# Patient Record
Sex: FEMALE | URBAN - METROPOLITAN AREA
[De-identification: names, ages, dates, MRNs, and addresses within clinical notes are randomized per-mention and may not be internally consistent; named-entity substitution may affect disease eponyms.]

---

## 2020-10-05 ENCOUNTER — RECORDS - HEALTHEAST (OUTPATIENT)
Dept: LAB | Facility: CLINIC | Age: 70
End: 2020-10-05

## 2020-10-06 LAB
25(OH)D3 SERPL-MCNC: 46.1 NG/ML (ref 30–80)
ANION GAP SERPL CALCULATED.3IONS-SCNC: 7 MMOL/L (ref 5–18)
BASOPHILS # BLD AUTO: 0 THOU/UL (ref 0–0.2)
BASOPHILS NFR BLD AUTO: 0 % (ref 0–2)
BUN SERPL-MCNC: 11 MG/DL (ref 8–28)
CALCIUM SERPL-MCNC: 9.3 MG/DL (ref 8.5–10.5)
CHLORIDE BLD-SCNC: 108 MMOL/L (ref 98–107)
CO2 SERPL-SCNC: 30 MMOL/L (ref 22–31)
CREAT SERPL-MCNC: 0.7 MG/DL (ref 0.6–1.1)
EOSINOPHIL # BLD AUTO: 0.1 THOU/UL (ref 0–0.4)
EOSINOPHIL NFR BLD AUTO: 2 % (ref 0–6)
ERYTHROCYTE [DISTWIDTH] IN BLOOD BY AUTOMATED COUNT: 11.5 % (ref 11–14.5)
GFR SERPL CREATININE-BSD FRML MDRD: >60 ML/MIN/1.73M2
GLUCOSE BLD-MCNC: 90 MG/DL (ref 70–125)
HCT VFR BLD AUTO: 38 % (ref 35–47)
HGB BLD-MCNC: 12.6 G/DL (ref 12–16)
IMM GRANULOCYTES # BLD: 0 THOU/UL
IMM GRANULOCYTES NFR BLD: 1 %
LYMPHOCYTES # BLD AUTO: 2.2 THOU/UL (ref 0.8–4.4)
LYMPHOCYTES NFR BLD AUTO: 36 % (ref 20–40)
MCH RBC QN AUTO: 31 PG (ref 27–34)
MCHC RBC AUTO-ENTMCNC: 33.2 G/DL (ref 32–36)
MCV RBC AUTO: 94 FL (ref 80–100)
MONOCYTES # BLD AUTO: 0.8 THOU/UL (ref 0–0.9)
MONOCYTES NFR BLD AUTO: 14 % (ref 2–10)
NEUTROPHILS # BLD AUTO: 2.8 THOU/UL (ref 2–7.7)
NEUTROPHILS NFR BLD AUTO: 47 % (ref 50–70)
PLATELET # BLD AUTO: 400 THOU/UL (ref 140–440)
PMV BLD AUTO: 11.5 FL (ref 8.5–12.5)
POTASSIUM BLD-SCNC: 3.9 MMOL/L (ref 3.5–5)
RBC # BLD AUTO: 4.06 MILL/UL (ref 3.8–5.4)
SODIUM SERPL-SCNC: 145 MMOL/L (ref 136–145)
TSH SERPL DL<=0.005 MIU/L-ACNC: 1.99 UIU/ML (ref 0.3–5)
VIT B12 SERPL-MCNC: >2000 PG/ML (ref 213–816)
WBC: 5.9 THOU/UL (ref 4–11)

## 2020-10-14 ENCOUNTER — RECORDS - HEALTHEAST (OUTPATIENT)
Dept: LAB | Facility: CLINIC | Age: 70
End: 2020-10-14

## 2020-10-15 LAB
25(OH)D3 SERPL-MCNC: 43 NG/ML (ref 30–80)
BASOPHILS # BLD AUTO: 0 THOU/UL (ref 0–0.2)
BASOPHILS NFR BLD AUTO: 1 % (ref 0–2)
EOSINOPHIL # BLD AUTO: 0.1 THOU/UL (ref 0–0.4)
EOSINOPHIL NFR BLD AUTO: 1 % (ref 0–6)
ERYTHROCYTE [DISTWIDTH] IN BLOOD BY AUTOMATED COUNT: 11.6 % (ref 11–14.5)
HCT VFR BLD AUTO: 41.1 % (ref 35–47)
HGB BLD-MCNC: 13.4 G/DL (ref 12–16)
IMM GRANULOCYTES # BLD: 0 THOU/UL
IMM GRANULOCYTES NFR BLD: 0 %
LEVETIRACETAM (KEPPRA): 32.8 UG/ML (ref 6–46)
LYMPHOCYTES # BLD AUTO: 2.1 THOU/UL (ref 0.8–4.4)
LYMPHOCYTES NFR BLD AUTO: 43 % (ref 20–40)
MCH RBC QN AUTO: 30.7 PG (ref 27–34)
MCHC RBC AUTO-ENTMCNC: 32.6 G/DL (ref 32–36)
MCV RBC AUTO: 94 FL (ref 80–100)
MONOCYTES # BLD AUTO: 0.9 THOU/UL (ref 0–0.9)
MONOCYTES NFR BLD AUTO: 18 % (ref 2–10)
NEUTROPHILS # BLD AUTO: 1.9 THOU/UL (ref 2–7.7)
NEUTROPHILS NFR BLD AUTO: 38 % (ref 50–70)
PLATELET # BLD AUTO: 255 THOU/UL (ref 140–440)
PMV BLD AUTO: 12.4 FL (ref 8.5–12.5)
RBC # BLD AUTO: 4.37 MILL/UL (ref 3.8–5.4)
VALPROATE SERPL-MCNC: 58.6 UG/ML (ref 50–150)
VIT B12 SERPL-MCNC: 1734 PG/ML (ref 213–816)
WBC: 4.9 THOU/UL (ref 4–11)

## 2025-02-24 ENCOUNTER — LAB REQUISITION (OUTPATIENT)
Dept: LAB | Facility: CLINIC | Age: 75
End: 2025-02-24
Payer: MEDICARE

## 2025-02-24 DIAGNOSIS — Z79.899 OTHER LONG TERM (CURRENT) DRUG THERAPY: ICD-10-CM

## 2025-02-24 DIAGNOSIS — F25.0 SCHIZOAFFECTIVE DISORDER, BIPOLAR TYPE (H): ICD-10-CM

## 2025-02-24 LAB
BASOPHILS # BLD AUTO: 0 10E3/UL (ref 0–0.2)
BASOPHILS NFR BLD AUTO: 0 %
EOSINOPHIL # BLD AUTO: 0 10E3/UL (ref 0–0.7)
EOSINOPHIL NFR BLD AUTO: 1 %
ERYTHROCYTE [DISTWIDTH] IN BLOOD BY AUTOMATED COUNT: 11.9 % (ref 10–15)
HCT VFR BLD AUTO: 39.5 % (ref 35–47)
HGB BLD-MCNC: 13.6 G/DL (ref 11.7–15.7)
IMM GRANULOCYTES # BLD: 0 10E3/UL
IMM GRANULOCYTES NFR BLD: 0 %
LYMPHOCYTES # BLD AUTO: 2 10E3/UL (ref 0.8–5.3)
LYMPHOCYTES NFR BLD AUTO: 36 %
MCH RBC QN AUTO: 31.9 PG (ref 26.5–33)
MCHC RBC AUTO-ENTMCNC: 34.4 G/DL (ref 31.5–36.5)
MCV RBC AUTO: 93 FL (ref 78–100)
MONOCYTES # BLD AUTO: 0.7 10E3/UL (ref 0–1.3)
MONOCYTES NFR BLD AUTO: 12 %
NEUTROPHILS # BLD AUTO: 2.9 10E3/UL (ref 1.6–8.3)
NEUTROPHILS NFR BLD AUTO: 52 %
NRBC # BLD AUTO: 0 10E3/UL
NRBC BLD AUTO-RTO: 0 /100
PLATELET # BLD AUTO: 164 10E3/UL (ref 150–450)
RBC # BLD AUTO: 4.27 10E6/UL (ref 3.8–5.2)
WBC # BLD AUTO: 5.7 10E3/UL (ref 4–11)

## 2025-02-24 PROCEDURE — 85025 COMPLETE CBC W/AUTO DIFF WBC: CPT | Mod: ORL | Performed by: PSYCHIATRY & NEUROLOGY

## 2025-03-03 ENCOUNTER — LAB REQUISITION (OUTPATIENT)
Dept: LAB | Facility: CLINIC | Age: 75
End: 2025-03-03
Payer: COMMERCIAL

## 2025-03-03 DIAGNOSIS — Z79.899 OTHER LONG TERM (CURRENT) DRUG THERAPY: ICD-10-CM

## 2025-03-03 DIAGNOSIS — F25.0 SCHIZOAFFECTIVE DISORDER, BIPOLAR TYPE (H): ICD-10-CM

## 2025-03-03 LAB
BASOPHILS # BLD AUTO: 0 10E3/UL (ref 0–0.2)
BASOPHILS NFR BLD AUTO: 1 %
EOSINOPHIL # BLD AUTO: 0.1 10E3/UL (ref 0–0.7)
EOSINOPHIL NFR BLD AUTO: 1 %
ERYTHROCYTE [DISTWIDTH] IN BLOOD BY AUTOMATED COUNT: 11.7 % (ref 10–15)
HCT VFR BLD AUTO: 39.8 % (ref 35–47)
HGB BLD-MCNC: 13.7 G/DL (ref 11.7–15.7)
IMM GRANULOCYTES # BLD: 0 10E3/UL
IMM GRANULOCYTES NFR BLD: 0 %
LYMPHOCYTES # BLD AUTO: 2.4 10E3/UL (ref 0.8–5.3)
LYMPHOCYTES NFR BLD AUTO: 37 %
MCH RBC QN AUTO: 31.8 PG (ref 26.5–33)
MCHC RBC AUTO-ENTMCNC: 34.4 G/DL (ref 31.5–36.5)
MCV RBC AUTO: 92 FL (ref 78–100)
MONOCYTES # BLD AUTO: 0.7 10E3/UL (ref 0–1.3)
MONOCYTES NFR BLD AUTO: 10 %
NEUTROPHILS # BLD AUTO: 3.2 10E3/UL (ref 1.6–8.3)
NEUTROPHILS NFR BLD AUTO: 51 %
NRBC # BLD AUTO: 0 10E3/UL
NRBC BLD AUTO-RTO: 0 /100
PLATELET # BLD AUTO: 178 10E3/UL (ref 150–450)
RBC # BLD AUTO: 4.31 10E6/UL (ref 3.8–5.2)
WBC # BLD AUTO: 6.3 10E3/UL (ref 4–11)

## 2025-03-03 PROCEDURE — 85025 COMPLETE CBC W/AUTO DIFF WBC: CPT | Mod: ORL | Performed by: PSYCHIATRY & NEUROLOGY

## 2025-03-10 ENCOUNTER — LAB REQUISITION (OUTPATIENT)
Dept: LAB | Facility: CLINIC | Age: 75
End: 2025-03-10
Payer: COMMERCIAL

## 2025-03-10 DIAGNOSIS — F25.0 SCHIZOAFFECTIVE DISORDER, BIPOLAR TYPE (H): ICD-10-CM

## 2025-03-10 DIAGNOSIS — Z79.899 OTHER LONG TERM (CURRENT) DRUG THERAPY: ICD-10-CM

## 2025-03-10 LAB
BASOPHILS # BLD AUTO: 0 10E3/UL (ref 0–0.2)
BASOPHILS NFR BLD AUTO: 0 %
EOSINOPHIL # BLD AUTO: 0.1 10E3/UL (ref 0–0.7)
EOSINOPHIL NFR BLD AUTO: 1 %
ERYTHROCYTE [DISTWIDTH] IN BLOOD BY AUTOMATED COUNT: 11.7 % (ref 10–15)
HCT VFR BLD AUTO: 41.7 % (ref 35–47)
HGB BLD-MCNC: 14.2 G/DL (ref 11.7–15.7)
IMM GRANULOCYTES # BLD: 0 10E3/UL
IMM GRANULOCYTES NFR BLD: 0 %
LYMPHOCYTES # BLD AUTO: 3.1 10E3/UL (ref 0.8–5.3)
LYMPHOCYTES NFR BLD AUTO: 45 %
MCH RBC QN AUTO: 31.9 PG (ref 26.5–33)
MCHC RBC AUTO-ENTMCNC: 34.1 G/DL (ref 31.5–36.5)
MCV RBC AUTO: 94 FL (ref 78–100)
MONOCYTES # BLD AUTO: 0.8 10E3/UL (ref 0–1.3)
MONOCYTES NFR BLD AUTO: 11 %
NEUTROPHILS # BLD AUTO: 3 10E3/UL (ref 1.6–8.3)
NEUTROPHILS NFR BLD AUTO: 43 %
NRBC # BLD AUTO: 0 10E3/UL
NRBC BLD AUTO-RTO: 0 /100
PLATELET # BLD AUTO: 208 10E3/UL (ref 150–450)
RBC # BLD AUTO: 4.45 10E6/UL (ref 3.8–5.2)
WBC # BLD AUTO: 7.1 10E3/UL (ref 4–11)

## 2025-03-10 PROCEDURE — 85025 COMPLETE CBC W/AUTO DIFF WBC: CPT | Mod: ORL | Performed by: PSYCHIATRY & NEUROLOGY

## 2025-03-17 ENCOUNTER — LAB REQUISITION (OUTPATIENT)
Dept: LAB | Facility: CLINIC | Age: 75
End: 2025-03-17
Payer: COMMERCIAL

## 2025-03-17 DIAGNOSIS — F25.0 SCHIZOAFFECTIVE DISORDER, BIPOLAR TYPE (H): ICD-10-CM

## 2025-03-17 DIAGNOSIS — Z79.899 OTHER LONG TERM (CURRENT) DRUG THERAPY: ICD-10-CM

## 2025-03-17 LAB
BASOPHILS # BLD AUTO: 0 10E3/UL (ref 0–0.2)
BASOPHILS NFR BLD AUTO: 0 %
EOSINOPHIL # BLD AUTO: 0.1 10E3/UL (ref 0–0.7)
EOSINOPHIL NFR BLD AUTO: 1 %
ERYTHROCYTE [DISTWIDTH] IN BLOOD BY AUTOMATED COUNT: 11.9 % (ref 10–15)
HCT VFR BLD AUTO: 43.5 % (ref 35–47)
HGB BLD-MCNC: 14.7 G/DL (ref 11.7–15.7)
IMM GRANULOCYTES # BLD: 0 10E3/UL
IMM GRANULOCYTES NFR BLD: 0 %
LYMPHOCYTES # BLD AUTO: 2.8 10E3/UL (ref 0.8–5.3)
LYMPHOCYTES NFR BLD AUTO: 38 %
MCH RBC QN AUTO: 31.5 PG (ref 26.5–33)
MCHC RBC AUTO-ENTMCNC: 33.8 G/DL (ref 31.5–36.5)
MCV RBC AUTO: 93 FL (ref 78–100)
MONOCYTES # BLD AUTO: 0.6 10E3/UL (ref 0–1.3)
MONOCYTES NFR BLD AUTO: 8 %
NEUTROPHILS # BLD AUTO: 4 10E3/UL (ref 1.6–8.3)
NEUTROPHILS NFR BLD AUTO: 53 %
NRBC # BLD AUTO: 0 10E3/UL
NRBC BLD AUTO-RTO: 0 /100
PLATELET # BLD AUTO: 231 10E3/UL (ref 150–450)
RBC # BLD AUTO: 4.66 10E6/UL (ref 3.8–5.2)
WBC # BLD AUTO: 7.5 10E3/UL (ref 4–11)

## 2025-03-17 PROCEDURE — 85025 COMPLETE CBC W/AUTO DIFF WBC: CPT | Mod: ORL | Performed by: PSYCHIATRY & NEUROLOGY

## 2025-03-24 ENCOUNTER — LAB REQUISITION (OUTPATIENT)
Dept: LAB | Facility: CLINIC | Age: 75
End: 2025-03-24
Payer: COMMERCIAL

## 2025-03-24 DIAGNOSIS — F25.0 SCHIZOAFFECTIVE DISORDER, BIPOLAR TYPE (H): ICD-10-CM

## 2025-03-24 DIAGNOSIS — Z79.899 OTHER LONG TERM (CURRENT) DRUG THERAPY: ICD-10-CM

## 2025-03-24 LAB
BASOPHILS # BLD AUTO: 0 10E3/UL (ref 0–0.2)
BASOPHILS NFR BLD AUTO: 0 %
EOSINOPHIL # BLD AUTO: 0.1 10E3/UL (ref 0–0.7)
EOSINOPHIL NFR BLD AUTO: 1 %
ERYTHROCYTE [DISTWIDTH] IN BLOOD BY AUTOMATED COUNT: 11.8 % (ref 10–15)
HCT VFR BLD AUTO: 43 % (ref 35–47)
HGB BLD-MCNC: 14.5 G/DL (ref 11.7–15.7)
IMM GRANULOCYTES # BLD: 0 10E3/UL
IMM GRANULOCYTES NFR BLD: 0 %
LYMPHOCYTES # BLD AUTO: 2.8 10E3/UL (ref 0.8–5.3)
LYMPHOCYTES NFR BLD AUTO: 40 %
MCH RBC QN AUTO: 31.3 PG (ref 26.5–33)
MCHC RBC AUTO-ENTMCNC: 33.7 G/DL (ref 31.5–36.5)
MCV RBC AUTO: 93 FL (ref 78–100)
MONOCYTES # BLD AUTO: 0.6 10E3/UL (ref 0–1.3)
MONOCYTES NFR BLD AUTO: 9 %
NEUTROPHILS # BLD AUTO: 3.4 10E3/UL (ref 1.6–8.3)
NEUTROPHILS NFR BLD AUTO: 49 %
NRBC # BLD AUTO: 0 10E3/UL
NRBC BLD AUTO-RTO: 0 /100
PLATELET # BLD AUTO: 248 10E3/UL (ref 150–450)
RBC # BLD AUTO: 4.63 10E6/UL (ref 3.8–5.2)
VALPROATE SERPL-MCNC: 51.3 UG/ML
WBC # BLD AUTO: 6.9 10E3/UL (ref 4–11)

## 2025-03-24 PROCEDURE — 85025 COMPLETE CBC W/AUTO DIFF WBC: CPT | Mod: ORL | Performed by: PSYCHIATRY & NEUROLOGY

## 2025-03-24 PROCEDURE — 80164 ASSAY DIPROPYLACETIC ACD TOT: CPT | Mod: ORL | Performed by: PSYCHIATRY & NEUROLOGY

## 2025-03-31 ENCOUNTER — LAB REQUISITION (OUTPATIENT)
Dept: LAB | Facility: CLINIC | Age: 75
End: 2025-03-31
Payer: COMMERCIAL

## 2025-03-31 ENCOUNTER — MEDICAL CORRESPONDENCE (OUTPATIENT)
Dept: HEALTH INFORMATION MANAGEMENT | Facility: CLINIC | Age: 75
End: 2025-03-31

## 2025-03-31 DIAGNOSIS — F25.0 SCHIZOAFFECTIVE DISORDER, BIPOLAR TYPE (H): ICD-10-CM

## 2025-03-31 DIAGNOSIS — Z79.899 OTHER LONG TERM (CURRENT) DRUG THERAPY: ICD-10-CM

## 2025-03-31 LAB
BASOPHILS # BLD AUTO: 0 10E3/UL (ref 0–0.2)
BASOPHILS NFR BLD AUTO: 0 %
EOSINOPHIL # BLD AUTO: 0.1 10E3/UL (ref 0–0.7)
EOSINOPHIL NFR BLD AUTO: 1 %
ERYTHROCYTE [DISTWIDTH] IN BLOOD BY AUTOMATED COUNT: 12 % (ref 10–15)
HCT VFR BLD AUTO: 42.7 % (ref 35–47)
HGB BLD-MCNC: 14.4 G/DL (ref 11.7–15.7)
IMM GRANULOCYTES # BLD: 0 10E3/UL
IMM GRANULOCYTES NFR BLD: 0 %
LYMPHOCYTES # BLD AUTO: 2.4 10E3/UL (ref 0.8–5.3)
LYMPHOCYTES NFR BLD AUTO: 34 %
MCH RBC QN AUTO: 30.8 PG (ref 26.5–33)
MCHC RBC AUTO-ENTMCNC: 33.7 G/DL (ref 31.5–36.5)
MCV RBC AUTO: 91 FL (ref 78–100)
MONOCYTES # BLD AUTO: 0.7 10E3/UL (ref 0–1.3)
MONOCYTES NFR BLD AUTO: 10 %
NEUTROPHILS # BLD AUTO: 3.7 10E3/UL (ref 1.6–8.3)
NEUTROPHILS NFR BLD AUTO: 54 %
NRBC # BLD AUTO: 0 10E3/UL
NRBC BLD AUTO-RTO: 0 /100
PLATELET # BLD AUTO: 216 10E3/UL (ref 150–450)
RBC # BLD AUTO: 4.68 10E6/UL (ref 3.8–5.2)
VALPROATE SERPL-MCNC: 34.7 UG/ML
WBC # BLD AUTO: 6.9 10E3/UL (ref 4–11)

## 2025-03-31 PROCEDURE — 80164 ASSAY DIPROPYLACETIC ACD TOT: CPT | Mod: ORL | Performed by: PSYCHIATRY & NEUROLOGY

## 2025-03-31 PROCEDURE — 85025 COMPLETE CBC W/AUTO DIFF WBC: CPT | Mod: ORL | Performed by: PSYCHIATRY & NEUROLOGY

## 2025-04-01 DIAGNOSIS — G23.1 PROGRESSIVE SUPRANUCLEAR PALSY (H): Primary | ICD-10-CM

## 2025-04-07 ENCOUNTER — LAB REQUISITION (OUTPATIENT)
Dept: LAB | Facility: CLINIC | Age: 75
End: 2025-04-07
Payer: COMMERCIAL

## 2025-04-07 DIAGNOSIS — Z79.899 OTHER LONG TERM (CURRENT) DRUG THERAPY: ICD-10-CM

## 2025-04-07 DIAGNOSIS — F25.0 SCHIZOAFFECTIVE DISORDER, BIPOLAR TYPE (H): ICD-10-CM

## 2025-04-07 LAB
BASOPHILS # BLD AUTO: 0 10E3/UL (ref 0–0.2)
BASOPHILS NFR BLD AUTO: 0 %
EOSINOPHIL # BLD AUTO: 0.1 10E3/UL (ref 0–0.7)
EOSINOPHIL NFR BLD AUTO: 1 %
ERYTHROCYTE [DISTWIDTH] IN BLOOD BY AUTOMATED COUNT: 12.1 % (ref 10–15)
HCT VFR BLD AUTO: 41.4 % (ref 35–47)
HGB BLD-MCNC: 13.5 G/DL (ref 11.7–15.7)
IMM GRANULOCYTES # BLD: 0 10E3/UL
IMM GRANULOCYTES NFR BLD: 0 %
LYMPHOCYTES # BLD AUTO: 1.9 10E3/UL (ref 0.8–5.3)
LYMPHOCYTES NFR BLD AUTO: 31 %
MCH RBC QN AUTO: 30.4 PG (ref 26.5–33)
MCHC RBC AUTO-ENTMCNC: 32.6 G/DL (ref 31.5–36.5)
MCV RBC AUTO: 93 FL (ref 78–100)
MONOCYTES # BLD AUTO: 0.7 10E3/UL (ref 0–1.3)
MONOCYTES NFR BLD AUTO: 11 %
NEUTROPHILS # BLD AUTO: 3.5 10E3/UL (ref 1.6–8.3)
NEUTROPHILS NFR BLD AUTO: 56 %
NRBC # BLD AUTO: 0 10E3/UL
NRBC BLD AUTO-RTO: 0 /100
PLATELET # BLD AUTO: 198 10E3/UL (ref 150–450)
RBC # BLD AUTO: 4.44 10E6/UL (ref 3.8–5.2)
WBC # BLD AUTO: 6.2 10E3/UL (ref 4–11)

## 2025-04-07 PROCEDURE — 85025 COMPLETE CBC W/AUTO DIFF WBC: CPT | Mod: ORL | Performed by: PSYCHIATRY & NEUROLOGY

## 2025-04-10 ENCOUNTER — TRANSCRIBE ORDERS (OUTPATIENT)
Dept: OTHER | Age: 75
End: 2025-04-10

## 2025-04-10 DIAGNOSIS — G23.1 PSP (PROGRESSIVE SUPRANUCLEAR PALSY) (H): Primary | ICD-10-CM

## 2025-04-21 ENCOUNTER — LAB REQUISITION (OUTPATIENT)
Dept: LAB | Facility: CLINIC | Age: 75
End: 2025-04-21
Payer: COMMERCIAL

## 2025-04-21 DIAGNOSIS — Z79.899 OTHER LONG TERM (CURRENT) DRUG THERAPY: ICD-10-CM

## 2025-04-21 DIAGNOSIS — F25.0 SCHIZOAFFECTIVE DISORDER, BIPOLAR TYPE (H): ICD-10-CM

## 2025-04-21 LAB
BASOPHILS # BLD AUTO: 0 10E3/UL (ref 0–0.2)
BASOPHILS NFR BLD AUTO: 0 %
EOSINOPHIL # BLD AUTO: 0.1 10E3/UL (ref 0–0.7)
EOSINOPHIL NFR BLD AUTO: 2 %
ERYTHROCYTE [DISTWIDTH] IN BLOOD BY AUTOMATED COUNT: 12.4 % (ref 10–15)
HCT VFR BLD AUTO: 39.6 % (ref 35–47)
HGB BLD-MCNC: 13.2 G/DL (ref 11.7–15.7)
IMM GRANULOCYTES # BLD: 0 10E3/UL
IMM GRANULOCYTES NFR BLD: 0 %
LYMPHOCYTES # BLD AUTO: 2.1 10E3/UL (ref 0.8–5.3)
LYMPHOCYTES NFR BLD AUTO: 33 %
MCH RBC QN AUTO: 30.9 PG (ref 26.5–33)
MCHC RBC AUTO-ENTMCNC: 33.3 G/DL (ref 31.5–36.5)
MCV RBC AUTO: 93 FL (ref 78–100)
MONOCYTES # BLD AUTO: 0.8 10E3/UL (ref 0–1.3)
MONOCYTES NFR BLD AUTO: 12 %
NEUTROPHILS # BLD AUTO: 3.4 10E3/UL (ref 1.6–8.3)
NEUTROPHILS NFR BLD AUTO: 53 %
NRBC # BLD AUTO: 0 10E3/UL
NRBC BLD AUTO-RTO: 0 /100
PLATELET # BLD AUTO: 191 10E3/UL (ref 150–450)
RBC # BLD AUTO: 4.27 10E6/UL (ref 3.8–5.2)
WBC # BLD AUTO: 6.4 10E3/UL (ref 4–11)

## 2025-04-21 PROCEDURE — 85025 COMPLETE CBC W/AUTO DIFF WBC: CPT | Mod: ORL | Performed by: PSYCHIATRY & NEUROLOGY

## 2025-04-28 ENCOUNTER — LAB REQUISITION (OUTPATIENT)
Dept: LAB | Facility: CLINIC | Age: 75
End: 2025-04-28
Payer: COMMERCIAL

## 2025-04-28 DIAGNOSIS — Z79.899 OTHER LONG TERM (CURRENT) DRUG THERAPY: ICD-10-CM

## 2025-04-28 DIAGNOSIS — F25.0 SCHIZOAFFECTIVE DISORDER, BIPOLAR TYPE (H): ICD-10-CM

## 2025-04-28 LAB
BASOPHILS # BLD AUTO: 0 10E3/UL (ref 0–0.2)
BASOPHILS NFR BLD AUTO: 0 %
EOSINOPHIL # BLD AUTO: 0.1 10E3/UL (ref 0–0.7)
EOSINOPHIL NFR BLD AUTO: 1 %
ERYTHROCYTE [DISTWIDTH] IN BLOOD BY AUTOMATED COUNT: 12.5 % (ref 10–15)
HCT VFR BLD AUTO: 39.4 % (ref 35–47)
HGB BLD-MCNC: 12.9 G/DL (ref 11.7–15.7)
IMM GRANULOCYTES # BLD: 0 10E3/UL
IMM GRANULOCYTES NFR BLD: 0 %
LYMPHOCYTES # BLD AUTO: 2.2 10E3/UL (ref 0.8–5.3)
LYMPHOCYTES NFR BLD AUTO: 38 %
MCH RBC QN AUTO: 30.9 PG (ref 26.5–33)
MCHC RBC AUTO-ENTMCNC: 32.7 G/DL (ref 31.5–36.5)
MCV RBC AUTO: 94 FL (ref 78–100)
MONOCYTES # BLD AUTO: 0.7 10E3/UL (ref 0–1.3)
MONOCYTES NFR BLD AUTO: 13 %
NEUTROPHILS # BLD AUTO: 2.8 10E3/UL (ref 1.6–8.3)
NEUTROPHILS NFR BLD AUTO: 48 %
NRBC # BLD AUTO: 0 10E3/UL
NRBC BLD AUTO-RTO: 0 /100
PLATELET # BLD AUTO: 183 10E3/UL (ref 150–450)
RBC # BLD AUTO: 4.18 10E6/UL (ref 3.8–5.2)
WBC # BLD AUTO: 5.8 10E3/UL (ref 4–11)

## 2025-04-28 PROCEDURE — 85025 COMPLETE CBC W/AUTO DIFF WBC: CPT | Mod: ORL | Performed by: PSYCHIATRY & NEUROLOGY

## 2025-05-05 ENCOUNTER — LAB REQUISITION (OUTPATIENT)
Dept: LAB | Facility: CLINIC | Age: 75
End: 2025-05-05
Payer: COMMERCIAL

## 2025-05-05 DIAGNOSIS — Z79.899 OTHER LONG TERM (CURRENT) DRUG THERAPY: ICD-10-CM

## 2025-05-05 DIAGNOSIS — F25.0 SCHIZOAFFECTIVE DISORDER, BIPOLAR TYPE (H): ICD-10-CM

## 2025-05-05 LAB
BASOPHILS # BLD AUTO: 0 10E3/UL (ref 0–0.2)
BASOPHILS NFR BLD AUTO: 1 %
EOSINOPHIL # BLD AUTO: 0.1 10E3/UL (ref 0–0.7)
EOSINOPHIL NFR BLD AUTO: 2 %
ERYTHROCYTE [DISTWIDTH] IN BLOOD BY AUTOMATED COUNT: 12.5 % (ref 10–15)
HCT VFR BLD AUTO: 42.9 % (ref 35–47)
HGB BLD-MCNC: 13.8 G/DL (ref 11.7–15.7)
IMM GRANULOCYTES # BLD: 0 10E3/UL
IMM GRANULOCYTES NFR BLD: 0 %
LYMPHOCYTES # BLD AUTO: 2.5 10E3/UL (ref 0.8–5.3)
LYMPHOCYTES NFR BLD AUTO: 44 %
MCH RBC QN AUTO: 30.7 PG (ref 26.5–33)
MCHC RBC AUTO-ENTMCNC: 32.2 G/DL (ref 31.5–36.5)
MCV RBC AUTO: 96 FL (ref 78–100)
MONOCYTES # BLD AUTO: 0.6 10E3/UL (ref 0–1.3)
MONOCYTES NFR BLD AUTO: 10 %
NEUTROPHILS # BLD AUTO: 2.5 10E3/UL (ref 1.6–8.3)
NEUTROPHILS NFR BLD AUTO: 44 %
NRBC # BLD AUTO: 0 10E3/UL
NRBC BLD AUTO-RTO: 0 /100
PLATELET # BLD AUTO: 172 10E3/UL (ref 150–450)
RBC # BLD AUTO: 4.49 10E6/UL (ref 3.8–5.2)
VALPROATE SERPL-MCNC: 65.4 UG/ML
WBC # BLD AUTO: 5.7 10E3/UL (ref 4–11)

## 2025-05-05 PROCEDURE — 85025 COMPLETE CBC W/AUTO DIFF WBC: CPT | Mod: ORL | Performed by: PSYCHIATRY & NEUROLOGY

## 2025-05-05 PROCEDURE — 80164 ASSAY DIPROPYLACETIC ACD TOT: CPT | Mod: ORL | Performed by: PSYCHIATRY & NEUROLOGY

## 2025-05-12 ENCOUNTER — LAB REQUISITION (OUTPATIENT)
Dept: LAB | Facility: CLINIC | Age: 75
End: 2025-05-12
Payer: COMMERCIAL

## 2025-05-12 DIAGNOSIS — Z79.899 OTHER LONG TERM (CURRENT) DRUG THERAPY: ICD-10-CM

## 2025-05-12 DIAGNOSIS — F25.0 SCHIZOAFFECTIVE DISORDER, BIPOLAR TYPE (H): ICD-10-CM

## 2025-05-12 PROBLEM — I44.7 LBBB (LEFT BUNDLE BRANCH BLOCK): Status: ACTIVE | Noted: 2022-05-18

## 2025-05-12 PROBLEM — F03.90 DEMENTIA WITHOUT BEHAVIORAL DISTURBANCE (H): Chronic | Status: ACTIVE | Noted: 2021-11-19

## 2025-05-12 PROBLEM — F29 PSYCHOSIS, UNSPECIFIED PSYCHOSIS TYPE (H): Status: ACTIVE | Noted: 2024-12-19

## 2025-05-12 PROBLEM — R53.1 GENERALIZED WEAKNESS: Status: ACTIVE | Noted: 2021-04-07

## 2025-05-12 PROBLEM — H40.039 ANATOMICAL NARROW ANGLE: Status: ACTIVE | Noted: 2021-10-20

## 2025-05-12 PROBLEM — F03.A0 MILD DEMENTIA (H): Status: ACTIVE | Noted: 2017-10-09

## 2025-05-12 PROBLEM — Z99.3 WHEELCHAIR DEPENDENCE: Status: ACTIVE | Noted: 2024-02-09

## 2025-05-12 PROBLEM — F20.0: Status: ACTIVE | Noted: 2024-12-19

## 2025-05-12 PROBLEM — F20.9 SCHIZOPHRENIA, CHRONIC CONDITION WITH ACUTE EXACERBATION (H): Status: ACTIVE | Noted: 2023-08-18

## 2025-05-12 PROBLEM — G23.1 PSP (PROGRESSIVE SUPRANUCLEAR PALSY) (H): Status: ACTIVE | Noted: 2025-05-12

## 2025-05-12 PROBLEM — H26.9 CATARACTS, BILATERAL: Status: ACTIVE | Noted: 2021-10-20

## 2025-05-12 PROBLEM — U07.1 COVID-19 VIRUS INFECTION: Status: ACTIVE | Noted: 2020-09-22

## 2025-05-12 PROBLEM — M85.852 OSTEOPENIA OF NECK OF LEFT FEMUR: Status: ACTIVE | Noted: 2019-12-17

## 2025-05-12 PROBLEM — K52.9 CHRONIC DIARRHEA: Chronic | Status: ACTIVE | Noted: 2021-11-19

## 2025-05-12 PROBLEM — G40.909 SEIZURE DISORDER (H): Chronic | Status: ACTIVE | Noted: 2021-11-19

## 2025-05-12 LAB
BASOPHILS # BLD AUTO: 0 10E3/UL (ref 0–0.2)
BASOPHILS NFR BLD AUTO: 0 %
EOSINOPHIL # BLD AUTO: 0.1 10E3/UL (ref 0–0.7)
EOSINOPHIL NFR BLD AUTO: 2 %
ERYTHROCYTE [DISTWIDTH] IN BLOOD BY AUTOMATED COUNT: 12.6 % (ref 10–15)
HCT VFR BLD AUTO: 45.5 % (ref 35–47)
HGB BLD-MCNC: 14.3 G/DL (ref 11.7–15.7)
IMM GRANULOCYTES # BLD: 0 10E3/UL
IMM GRANULOCYTES NFR BLD: 0 %
LYMPHOCYTES # BLD AUTO: 2.3 10E3/UL (ref 0.8–5.3)
LYMPHOCYTES NFR BLD AUTO: 38 %
MCH RBC QN AUTO: 30.6 PG (ref 26.5–33)
MCHC RBC AUTO-ENTMCNC: 31.4 G/DL (ref 31.5–36.5)
MCV RBC AUTO: 97 FL (ref 78–100)
MONOCYTES # BLD AUTO: 0.6 10E3/UL (ref 0–1.3)
MONOCYTES NFR BLD AUTO: 9 %
NEUTROPHILS # BLD AUTO: 3.1 10E3/UL (ref 1.6–8.3)
NEUTROPHILS NFR BLD AUTO: 51 %
NRBC # BLD AUTO: 0 10E3/UL
NRBC BLD AUTO-RTO: 0 /100
PLATELET # BLD AUTO: 179 10E3/UL (ref 150–450)
RBC # BLD AUTO: 4.67 10E6/UL (ref 3.8–5.2)
WBC # BLD AUTO: 6.1 10E3/UL (ref 4–11)

## 2025-05-12 PROCEDURE — 85025 COMPLETE CBC W/AUTO DIFF WBC: CPT | Mod: ORL | Performed by: PSYCHIATRY & NEUROLOGY

## 2025-05-12 RX ORDER — GABAPENTIN 100 MG/1
1 CAPSULE ORAL 3 TIMES DAILY
COMMUNITY
Start: 2024-02-19 | End: 2025-05-23

## 2025-05-12 RX ORDER — TROLAMINE SALICYLATE 10 G/100G
CREAM TOPICAL
COMMUNITY
End: 2025-05-23

## 2025-05-12 RX ORDER — GUAIFENESIN 600 MG/1
600 TABLET, EXTENDED RELEASE ORAL 2 TIMES DAILY PRN
COMMUNITY
Start: 2025-04-10 | End: 2025-05-23

## 2025-05-12 RX ORDER — LANOLIN ALCOHOL/MO/W.PET/CERES
500 CREAM (GRAM) TOPICAL DAILY
COMMUNITY
Start: 2025-02-20

## 2025-05-12 RX ORDER — LOPERAMIDE HYDROCHLORIDE 2 MG/1
TABLET ORAL
COMMUNITY
Start: 2025-03-24

## 2025-05-12 RX ORDER — SIMETHICONE 125 MG
125 TABLET,CHEWABLE ORAL 3 TIMES DAILY PRN
COMMUNITY
Start: 2025-03-24 | End: 2025-05-23

## 2025-05-12 RX ORDER — GUAIFENESIN 200 MG/10ML
LIQUID ORAL
COMMUNITY
Start: 2025-02-20

## 2025-05-12 RX ORDER — POLYETHYLENE GLYCOL 3350 17 G/17G
17 POWDER, FOR SOLUTION ORAL 2 TIMES DAILY PRN
COMMUNITY
Start: 2023-08-25

## 2025-05-12 RX ORDER — MUSCLE RUB CREAM 100; 150 MG/G; MG/G
CREAM TOPICAL
COMMUNITY

## 2025-05-12 RX ORDER — ACETAMINOPHEN 325 MG/1
2 TABLET ORAL EVERY 4 HOURS PRN
COMMUNITY
Start: 2025-02-20

## 2025-05-12 RX ORDER — PROPRANOLOL HYDROCHLORIDE 10 MG/1
10 TABLET ORAL 3 TIMES DAILY PRN
COMMUNITY
Start: 2023-11-13 | End: 2025-05-23

## 2025-05-12 RX ORDER — MENTHOL AND METHYL SALICYLATE 7.6; 29 G/100G; G/100G
OINTMENT TOPICAL
COMMUNITY
Start: 2023-09-05

## 2025-05-12 RX ORDER — AMOXICILLIN 250 MG
1 CAPSULE ORAL DAILY PRN
COMMUNITY
Start: 2025-03-24 | End: 2025-05-23

## 2025-05-12 RX ORDER — ATROPINE SULFATE 10 MG/ML
1 SOLUTION/ DROPS OPHTHALMIC 4 TIMES DAILY PRN
COMMUNITY
Start: 2024-10-01

## 2025-05-12 RX ORDER — BUSPIRONE HYDROCHLORIDE 5 MG/1
5 TABLET ORAL 2 TIMES DAILY
COMMUNITY
Start: 2024-02-19

## 2025-05-12 RX ORDER — LOPERAMIDE HYDROCHLORIDE 2 MG/1
2 CAPSULE ORAL
COMMUNITY
Start: 2025-02-20 | End: 2025-05-12

## 2025-05-12 RX ORDER — BUTALB/ACETAMINOPHEN/CAFFEINE 50-325-40
1 TABLET ORAL DAILY
COMMUNITY
Start: 2025-02-20

## 2025-05-12 RX ORDER — MELOXICAM 7.5 MG/1
7.5 TABLET ORAL 2 TIMES DAILY
COMMUNITY
Start: 2025-04-10

## 2025-05-12 RX ORDER — B COMPLEX, C NO.20/FOLIC ACID 1 MG
1 CAPSULE ORAL DAILY
COMMUNITY
Start: 2025-03-23

## 2025-05-12 RX ORDER — DONEPEZIL HYDROCHLORIDE 10 MG/1
10 TABLET, FILM COATED ORAL DAILY
COMMUNITY
Start: 2024-09-16

## 2025-05-12 RX ORDER — IBUPROFEN 200 MG
200 TABLET ORAL EVERY 6 HOURS PRN
COMMUNITY
Start: 2025-02-20

## 2025-05-12 RX ORDER — GLYCOPYRROLATE 2 MG/1
2 TABLET ORAL 2 TIMES DAILY
COMMUNITY
End: 2025-05-23

## 2025-05-12 RX ORDER — DOCUSATE SODIUM 100 MG/1
100 CAPSULE, LIQUID FILLED ORAL 2 TIMES DAILY
COMMUNITY
Start: 2025-02-20

## 2025-05-12 NOTE — PROGRESS NOTES
Diagnosis/Summary/Recommendations:    PATIENT: Nabila Jefferson  74 year old female     : 1950    NEO: May 23, 2025     MRN: [id number]    74 year old female  1950  Comm Pref:   retired at [employer]  [address]  [facility] [address]     [phone] (H)  [phone] (M)      Jad.Janna  Other Contact, Sister    Patient Contacts  Contact Name Contact Address Communication Relationship to Patient   Amanda Oconnor Unknown [phone] (Home)  [phone] (Mobile) Friend, Personal Relationship   Darlyn Barroso Unknown [phone] (Mobile) Daughter, Personal Relationship   Macy Barrientos Unknown [phone] (Mobile) Sister, Personal Relationship   Janna Jad Unknown [phone] (Mobile) Sister, Personal Relationship   Mary Garza Unknown [phone] (Mobile) Sister, Personal Relationship   Dru Jefferson Unknown [phone] (Mobile) Son, Personal Relationship   Mumtaz-Tam Unknown [phone] (Mobile) Personal Relationship     Ronny Gonsales Unknown [phone] (Work) Son, Guardian   Ifeanyi Jefferson Unknown [phone] (Mobile) Sibling, Emergency Contact   Mary Garza Unknown [phone] (Mobile) Sibling, Emergency Contact   Chio Santizo Unknown [phone] (Work) Daughter, Emergency Contact   Janna Lopezleighann Unknown [phone] (Mobile) Sibling, Emergency Contact       Care Teams  Team Member Relationship Specialty Start Date End Date   Liam Taylor MD   NPI: [id number]   [address] [place] [address] [id number] [phone] (Work)   [phone] (Fax)  PCP - General Pediatric 3/13/25     Pancho Asher MD   NPI: [id number]   [address] [place], [place] [address] [phone] (Work)   [phone] (Fax)  Psychiatry Psychiatry 13     Sadaf Nascimento RN   [address] [address]   [phone] (Work)   [phone] (Fax)  Care Manager - [facility] Registered Nurse 10/21/21     Soheila Yeboah RN   [address] [facility]    [address]   [place] [address]   [phone] (Work)   [phone] (Fax)  Care Manager - [facility] [place] 10/21/21     [facility]   [phone] (Work)      7/6/17     Jena De La TorreROSHNI   Genevive   [address] [facility], [address]   [place] [address]   [phone] (Work)  Care Manager - [facility] Mayorga MD   NPI: [id number]   [address]   [place] [address]   [phone] (Work)   [phone] (Fax)  PCP - Psychiatry Outside Provider 6/7/10         Former / Aliases:  Nabila Jefferson [phone] (Mobile)  [email or web address]       Assessment:  (G23.1) PSP (progressive supranuclear palsy) (H)  (primary encounter diagnosis)    Seizure disroder    Brain MRI 1/2/2025  1. Progression of diffuse parenchymal atrophy without specific lobar pattern since 2020.  2. Marked atrophy of the midbrain with decreased uhxsxksa-tj-ojsv ratio, which can be seen in the setting of progressive supranuclear palsy.      Reading Radiologist: Chava Lai    Brain MRI without intravenous contrast    Indication:  Parkinsonian syndrome  .    Comparison:  Head CT 9/21/2020. MRI 6/2/2010.    Technique: Multiplanar multisequence MR images of the brain without contrast a few using Parkinson's/movement disorders protocol.    Findings:  Progression of diffuse parenchymal atrophy since 2020 and 2010, evidenced by large sulci, sylvian fissures, ventricles. Significant atrophy of the midbrain with relatively preserved pontine volume, giving the appearance of the hummingbird sign on midline sagittal image (using short axis technique, the midbrain to donis ratio is estimated at 0.33).    No abnormal restricted diffusion. Few punctate T2 hyperintense foci in the cerebral hemispheric white matter. No intracranial hemorrhage or mass. Orbits are unremarkable. Small left and trace right mastoid effusions.    Chart review    Paranoid schizophrenia, subchronic condition  with acute exacerbation (CMS/[facility]) 12/19/2024     Overview (12/19/2024):    Formatting of this note might be different from the original.  EEG generalized cortical dysfunction likely medication affect; propensity for partial seizures.  Psychiatrist- Dr. Pancho Asher  - Amanda Oconnor  neuropsych testing completed 1/17/11  Admitted w/ exaccerbation 8/11 (add zoloft, d/c resperidal (dizziness resolved off med), additional clozaril)  Readmitted w/ exac 8/25/11  Current medications: depakote, clozaril,       From Chart review   Neurology: Dr. Dixon ([facility])  Last visit 9/8/2017 (no changes); weight loss,  Last seizure 2013.    Admission 4/19/13-4/27/13 then transferred to rehabilitation center; tonic clonic seizure with Jose Alfredo's paralysis; slow to wake up with confusion. Intubated for airway protection.   5/13 neurology visit:  Negative MRI, MRA, cervical MRA, EEG- focal left frontotemporal polymorphic delta slowing.  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 Dixon ([place])  Last visit 9/8/2017 (no changes); weight loss,  Last seizure 2013.    Admission 4/19/13-4/27/13 then transferred to rehabilitation center; tonic clonic seizure with Jose Alfredo's paralysis; slow to wake up with confusion. Intubated for airway protection.   5/13 neurology visit:  Negative MRI, MRA, cervical MRA, EEG- focal left frontotemporal polymorphic delta slowing.  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   This improved over time and did not have residual symptoms. This was supposedly caused by medication changes  This caused twitchiness in her hands and fingers as well     The patient began to have falls ~2 years ago and had  bradykinesia over the last year or so  These walking has worsened in the past 6 months or so  At this stage the patient is not walking much at all and is only walking to the bathroom and back  Last fall was about a month ago.  Uses a walker when she walks to the bathroom.  Has freezing of gait with walking per her PT when she is hospitalized.    The tremor was first noted while the patient was hospitalized at [facility]  She never appreciated prior to this as it is difficult to differentiate from her twitchiness of her hands from her tardive.     Denies any dystonia or significant rigidity.     Review of diagnosis    Parkinsonism     Avoidance of dopamine blockers   Is taking Clozaril    Motor complication review   None    Review of Impulse control disorders   None    Review of surgical or medication options   N/A    Gait/Balance/Falls   Last fall is a month ago  Wheelchair dependence.     Exercise/Therapy performed/offered   Has not done PT since leaving the hospital  Reports that this doesn't help her at all to do the PT    Cognitive/Driving   Memory and thinking are going well per the patient.  Donepezil has helped her memory at some point  Her memory was at the worst it had been prior to checking into the hospital in December.   Her memory has been impaired for the past 3-4 years per her son.  Has some periods of losing time    dementia    Mood   Has a diagnosis of bipolar disorder   Since leaving the hospital her mood has been a bit of a challenge   She is feeling anxious and depressed     Has intermittent spontanoeus crying but seems to be triggered by memories.     Depakote level 65.4 on 5/5/2025      Hallucinations/delusions   The patient began to have significant hallucinations in December 2024 and was hospitalized at [facility] due to not consistently getting her mediations    Had hallucinations prior to her hospitalization in December but none since then.     Paranoid schizophrneia  Psycohsis    Sleep   Is sleeping  most of the day at this point.   Part of this is driven by the fact that she doesn't want to get up and go around the group home  No history of dream enactment behavior    Bladder/Renal/Prostate/Gyn/Other  Has no urinary incontinence at this stage  Did previously when she had a bed that was too low for her to get out of bed on her own    Chronic renal disease -blood workk normal on 12/26/2024    GI/Constipation/GERD   Swallowing is going okay other than dry mouth from the atropine.   Has drooling problems and takes atropine  Constipation is not a big issue for her  Diarrhea is a chronic problem  Has some incontinence    Chronic diarrhea  Esophageal dysmotility   Liver functions normal 12/26/2024      ENDO/Lipid/DM/Bone density/Thyroid  No DM or thyroid issue    Osteopenia  T4 was normal on 12/26/2024 and tSH was increased at 4.85    Cardio/heart/Hyper or Hypotensive   Hypotension  No significant lightheadedness or dizziness  No other cardiac problems    Vision/Dry Eyes/Cataracts/Glaucoma/Macular   Feels like she can't see because she doesn't have the appropriate glasses    Anatomical narrow angle  Bilateral cataracts     Heme/Anticoagulation/Antiplatelet/Anemia/Other  No history of blood clotting disorders    CBC was normal on 5/5/2025    ENT/Resp  Covid  Has no significant breathing problems   Has an intact sense of smell    Skin/Cancer/Seborrhea/other  No issues with skin cancer    Musculoskeletal/Pain/Headache  Osteopenia left femur  Generalized weakness  Fall risk  Wheelchair dependences  Has been having severe right thigh>left thigh pain.   This has been there for quite awhile but isn't sure when this started exactly.       Other:        Medications      AM   QHS   Acetaminophen tylenol 325mg  PRN       Aspirin 81mg   1      Atropine 1% sol ORAL PRN       B complex        Buspirone buspar 5mg   1   1   Jacky citrate Vit D  1      Clozapine clozaril 200mg      1   Clozapine clozaril 50mg      1   Diclofenac  voltaren gel  PRN       Divalproex depakote ER 500mg 24h  1   1   Docusate colace 100mg   2   2   Donepezil aricept 10mg   1      Ibuprofen advil 200mg  PRN       Levetiracetam keppra 500mg  2   2   Loperamide imodium 2mg PRN       MVI  1      Melatonin 3 mg     1   Polyethylene glycol miralax   1   1   Psyllium   1      Vit B12 cyanocobalamin 500mcg  1/2                        Plan:  Parkinsonism - drug induced from Risperdal vs PD vs PSP.    We will order a few different tests to determine what exactly is going on today.    The first test is a special brain scan called a LEONA scan which can help us determine if this is drug induced or if this is due to another condition such as parkinson disease or PSP.    If that test is positive we may consider doing another test called a skin biopsy to determine if this is Parkinson disease or PSP.     In the meantime working with physical therapy would be a good place to start. We can have them come to your home to help with your rehab without making you leave home                    Documentation of a Face-to-Face Physician Encounter May 23, 2025    Nabila Jefferson  1950  [id number]    I certify that this patient is under my care and that I, or a nurse practitioner or physician's assistant working with me, had a face-to-face encounter that meets the physician face-to-face encounter requirements with this patient on: 5/23/2025.    The encounter with the patient was in whole, or in part, for the following medical condition, which is the primary reason for home health care:  Parkinsonism    I certify that, based on my findings, the following services are medically necessary home health services: Nursing, Occupational therapy and Physical Therapy    My clinical findings support the need for the above services because: Parkinsonism    Further, I certify that my clinical findings support that this patient is homebound (i.e. absences from home require considerable and taxing  effort and are for medical reasons or Yarsanism services or infrequently or of short duration when for other reasons) because: Parkinsonism      Physician signature ___________________________________   May 23, 2025  Physician name: Ajit Tolentino MD    Fax ([phone]) or scan/email ([email or web address]) this completed document to [facility] within 24 hours of the referral date.  Questions: [phone].      Coding statement:   Medical Decision Making:  #  Chronic progressive medical conditions addressed  - see above --   Review and/or interpretation of unique test or documentation from a provider outside of neurology yes   Independent historian provided additional details  no/yes  Prescription drug management and review of potential side effects and/or monitoring for side effects  -- see above ---  Health impacted by social determinants of health  yes    I have reviewed the note as documented above.  This accurately captures the substance of my conversation with the patient and total time spent preparing for visit, executing visit and completing visit on the day of the visit:  70 minutes.  The portion of this total time included face to face time     The longitudinal plan of care for Nabila Jefferson was addressed during this visit. Due to the added complexity in care, I will continue to support Nabila Jefferson in the subsequent management of this condition(s) and with the ongoing continuity of care of this condition(s).      Khadar Gray MD     ______________________________________  /83   Pulse 89   Resp 16   SpO2 94%   Gen: alert, active, attentive, appropriately groomed   Psych: mood stable     NEURO:  MS: Tangential and struggles to understand questions.     CN:  II: VFF.  III, IV, VI: Reduced EOM. Saccades appear intact without issues  VII: Face symmetric at rest and with activation  VIII: Intact to conversation  IX, X: Palate rise b/l, uvula midline.  No dysarthria.  XII: Tongue protrudes  midline. No fasciculation or atrophy noted.    Motor: Head bobbing and tongue and mouth movements   R/L  Shoulder abd      5/5  Elbow flex  5/5  Elbow ext  5/5  Wrist flex  5/5  Wrist ext  5/5    Hip flex  5/5  Knee flex  5/5  Knee ext  5/5    Reflexes: Reduced reflexes throughout. Positive gllobellar, negaitve clap, grasp and pamlmomental    Sensation:  Intact to LT in all extremities.    Coordination:  FTN intact bilaterally.    Gait:  Reduced stride length and right arm swing. Decent turns.         5/23/2025     3:00 PM   UPDRS Motor Scale   Time: 15:13   Medication Off   R Brain DBS: None   L Brain DBS: None   Dyskinesia (LID) No   Did LID interfere No   Speech 1   Facial Expression 2   Rigidity Neck 0   Rigidity RUE 1   Rigidity LUE 2   Rigidity RLE 1   Rigidity LLE 1   Finger Taps R 2   Finger Taps L 1   Hand Mvt R 1   Hand Mvt L 0   Pron-/Supinate R 1   Pron-/Supinate L 0   Toe Tap R 1   Toe Tap L 0   Leg Agility R 1   Leg Agility L 0   Postural Tremor RUE 0   Postural Tremor LUE 0   Kinetic Tremor RUE 0   Kinetic Tremor LUE 1   Rest Tremor RUE 0   Rest Tremor LUE 2   Rest Tremor RLE 0   Rest Tremor LLE 0   Rest Tremor Lip/Jaw 0   Rest Tremor Constancy 3   Total Right 8   Total Left 7     Last visit date and details:     [facility] about [facility] my questions - not sure ill get much more information from her but advised to have a family member with her on the visit. Rooming will get an updated authorization to discuss form.  Sabina Dennis RN  KG    5/20/25 10:30 AM  Note  Summary: Pre-visit plann   Upcoming visit date: 5/23/25  Provider: Dr. Gray  Referred by: Palma Loving PA-C ([facility] for a second opinion  Diagnosis: PSP     Lives in a group home, Dru (son) was recommended to attend the visit with Dr. Gray.    Goals for the visit:     1. Second opinion on her condition.     She states no family is coming with her to the appointment but someone may come with her. Recommended  "she bring a family member to have a second set of eyes and ears. Reviewed the appointment coming up with Dr. Gray. When asked about getting an updated medication list and verifying the group home she lives in she stated she is not comfortable \"answering all these questions.\" She stated will talk to us on Friday. She then asked if I can call back in a little while and hung up.    [facility] is listed as her group home. Called and left a voicemail on [facility] ([facility]) confidential line asking for a medication list to be faxed to [phone].            3/31/2025 note    Nabila Jefferson is a 74 year-old with a past medical history including dementia without behavioral disturbance, seizure disorder, schizophrenia, gait abnormality with recurrent falls, hypotension, chronic kidney disease and others who is seen in neurological follow-up related to suspected progressive supranuclear palsy. The patient was hospitalized at [place] beginning in January 2025 with changes in her mental status including increased hallucinations. PT was consulted due to gait instability and falls. Brain MRI was completed with findings suggestive of progressive supranuclear palsy. Neurology did evaluate the patient and suspected that she does have PSP. They did not recommend a trial of carbidopa levodopa because of her psychosis and history of orthostasis with recurrent falls.    She comes to clinic with a group home staff worker today. She does vaguely recall being told about PSP but is not clear on the details. Since she was discharged from hospital she has been walking short distances. She has continued to have occasional falls. She usually falls backwards. She does endorse stiffness and tremor. She is maintained on Clozaril and would not want to stop it. She recalls having a psychiatric decompensation in the past with an attempt to stop Clozaril.    Because of the potential severity of her diagnosis and the risks with using Sinemet we " "agreed to get another opinion with a movement disorder specialist at the [facility].     She has found Aricept very helpful with her cognitive function we will plan to continue this.    Our discussion included:  We reviewed that her history, examination and brain MRI are concerning for a condition called progressive supranuclear palsy. We agreed to plan a second opinion through the [facility] movement disorder clinic. I called her son, Dru, and encouraged him to attend this appointment with her. We agreed not to try carbidopa-levodopa now since it could make you schizophrenia symptoms and falls worse.   We do not have a disease modifying medication for parkinsonian disorders, including PSP.   OT and PT can help manage symptoms with PSP. Recurrent falls are common.   I will update her psychiatry provider. I refilled aricept.   We'll plan follow up here in three months but she can cancel this if she establishes care at the [facility].     Return in about 3 months (around 6/30/2025).    History of Present Illness:     Referred by Dimitri Langford MD for gait abnormality evaluation.     Ms. Jefferson was hospitalized in early 2025 and seen by neurology for evaluation of parkinsonism. Neurology met with her and the clinical picture was suggestive of parkinsomism, with a \"parkinson plus\" syndrome suspected. Progressive supranuclear palsy (PSP) seemed likely based on her presentation. Neurology was reluctant to use sinemet due to her history of orthostatic hypotension, frequent falls and psychosis. She was observed to have a resting tremor in bilateral hands, worse with distraction, shuffling gait going back 10 years, and mild rigidity. During her hospitalization, she had severe postural instability, unable to stand without assistance, sit up without assistance, or ambulate without a walker/assistance. Brain MRI was completed and concerning for PSP structural changes.     She presents with group home staff today. They " note she has been walking short distances, using a wheelchair for longer distances. She has had ongoing falls since hospital discharge. She usually falls backwards. She has noted difficulty looking upward.     She agrees she has memory impairment but feels aricept has been very helpful.     She makes her own medical decisions, has two children locally and would consider involving them more. She gives me permission to call her son.     Brain MRI 1/2025:   1. Progression of diffuse parenchymal atrophy without specific lobar pattern since 2020.   2. Marked atrophy of the midbrain with decreased vmjoeruw-ey-fjak ratio, which can be seen in the setting of progressive supranuclear palsy.     Mental Status Exam: She is awake, alert, and cooperative with interview. She can follow some simple commands easily  Cranial Nerves: saccadic intrusions of smooth movements, poor initiation of saccades, upgaze intact on my examination, facial movements symmetric, hearing intact to conversation, mild dysphonia  Motor: Symmetric strength against gravity and resistance bilaterally. Moderately increased tone bilaterally. Bradykinesia noted bilaterally. Bilateral arm resting tremor, more prominent right than left  Sensory: sensation intact to light touch on arms and legs bilaterally  Coordination: finger-nose-finger intact bilaterally  Gait: Rises from a chair with assistance of arms rests, can not take steps. Pull test negative today       ______________________________________      Patient was asked about 14 Review of systems including changes in vision (dry eyes, double vision), hearing, heart, lungs, musculoskeletal, depression, anxiety, snoring, RBD, insomnia, urinary frequency, urinary urgency, constipation, swallowing problems, hematological, ID, allergies, skin problems: seborrhea, endocrinological: thyroid, diabetes, cholesterol; balance, weight changes, and other neurological problems and these were not significant at this time  except for   Patient Active Problem List   Diagnosis    Anatomical narrow angle    Bipolar disorder (H)    Cataracts, bilateral    Chronic diarrhea    Chronic kidney disease, stage 2 (mild)    COVID-19 virus infection    Dementia without behavioral disturbance (H)    Mild dementia (H)    Esophageal dysmotility    Gait abnormality    Generalized tonic-clonic seizure (H)    Generalized weakness    Hypotension    LBBB (left bundle branch block)    Paranoid schizophrenia, subchronic condition with acute exacerbation (H)    Osteopenia of neck of left femur    Personal history of fall    Schizophrenia (H)    Schizophrenia, chronic condition with acute exacerbation (H)    Seizure disorder (H)    Wheelchair dependence    Psychosis, unspecified psychosis type (H)    PSP (progressive supranuclear palsy) (H)        No Known Allergies  No past surgical history on file.  Past Medical History:   Diagnosis Date    PSP (progressive supranuclear palsy) (H) 05/12/2025     Social History     Socioeconomic History    Marital status: Single     Spouse name: Not on file    Number of children: Not on file    Years of education: Not on file    Highest education level: Not on file   Occupational History    Not on file   Tobacco Use    Smoking status: Not on file    Smokeless tobacco: Not on file   Substance and Sexual Activity    Alcohol use: Not on file    Drug use: Not on file    Sexual activity: Not on file   Other Topics Concern    Not on file   Social History Narrative    Not on file     Social Drivers of Health     Financial Resource Strain: Patient Unable To Answer (12/19/2024)    [facility]    Overall Financial Resource Strain (CARDIA)     Difficulty of Paying Living Expenses: Patient unable to answer   Food Insecurity: Patient Unable To Answer (12/19/2024)    Received from [facility]    Hunger Vital Sign     Worried About Running Out of Food in the Last Year: Patient unable to answer     Ran Out of  Food in the Last Year: Patient unable to answer   Transportation Needs: Patient Unable To Answer (12/19/2024)    Received from [facility]    PRAPARE - Transportation     Lack of Transportation (Medical): Patient unable to answer     Lack of Transportation (Non-Medical): Patient unable to answer   Physical Activity: Unknown (12/17/2019)    Received from [facility]    Exercise Vital Sign     Days of Exercise per Week: 0 days     Minutes of Exercise per Session: Not on file   Stress: Not on file   Social Connections: Socially Integrated (4/5/2024)    Received from [facility]    Social Connections     Do you often feel lonely or isolated from those around you?: 0   Interpersonal Safety: Patient Unable To Answer (12/19/2024)    Received from [facility]    Humiliation, Afraid, Rape, and Kick questionnaire     Fear of Current or Ex-Partner: Patient unable to answer     Emotionally Abused: Patient unable to answer     Physically Abused: Patient unable to answer     Sexually Abused: Patient unable to answer   Housing Stability: Unknown (12/19/2024)    Received from [facility]    Housing Stability     What is your housing situation today?: 5 - Patient unable to answer       Drug and lactation database from the [place] [employer]:  http://toxnet.nlm.nih.gov/cgi-bin/sis/htmlgen?LACT      B/P: Data Unavailable, T: Data Unavailable, P: Data Unavailable, R: Data Unavailable 0 lbs 0 oz  There were no vitals taken for this visit., There is no height or weight on file to calculate BMI.  Medications and Vitals not listed above were documented in the cart and reviewed by me.     Current Outpatient Medications   Medication Sig Dispense Refill    acetaminophen (TYLENOL) 325 MG tablet Take 2 tablets by mouth every 4 hours as needed.      atropine 1 % ophthalmic solution Place 1 drop under the tongue 4 times daily as  needed.      busPIRone (BUSPAR) 5 MG tablet Take 5 mg by mouth 2 times daily.      calcium citrate-vitamin D (CITRACAL) 315-5 MG-MCG TABS per tablet Take 1 tablet by mouth daily.      cyanocobalamin (VITAMIN B-12) 1000 MCG tablet Take 500 mcg by mouth daily.      diclofenac (VOLTAREN) 1 % topical gel Apply 2-4 g (measured using the dosing card supplied in the drug product carton) to skin of affected area up to 4 times a day as needed for musculoskeletal pain. Wash hands after application of product.      docusate sodium (DSS) 100 MG capsule Take 100 mg by mouth 2 times daily.      donepezil (ARICEPT) 10 MG tablet Take 10 mg by mouth daily.      Ferrous Sulfate (IRON PO) Take 1 tablet by mouth daily.      gabapentin (NEURONTIN) 100 MG capsule Take 1 capsule by mouth 3 times daily.      guaiFENesin (MUCINEX) 600 MG 12 hr tablet Take 600 mg by mouth 2 times daily as needed.      ibuprofen (ADVIL/MOTRIN) 200 MG tablet Take 200 mg by mouth every 6 hours as needed.      loperamide (IMODIUM A-D) 2 MG tablet Take 2 tablets (4mg) orally with 1st loose stool, then 1 tablet (2mg) with other loose stools. Max 8 tablets (16 mg) in 24 hrs.      loperamide (IMODIUM) 2 MG capsule Take 2 mg by mouth.      meloxicam (MOBIC) 7.5 MG tablet Take 7.5 mg by mouth 2 times daily.      methyl salicylate-menthol (ICY HOT) ointment Apply topically.      multivitamin RENAL (TRIPHROCAPS) 1 capsule capsule Take 1 capsule by mouth daily.      NONFORMULARY Hospital socks for /balance.      PERMETHRIN EX Wheelchair: Standard  with leg rests: (Elevating Length of need: TBD      PERMETHRIN EX Please dispense one full size bed.      PERMETHRIN EX Hospital bed with mattress and 1/2 rails. Semi-electric bed. Length of need 3 months. Bed extender:no      PERMETHRIN EX For personal use. Length: calf Strength: 16-20 mmHg Circumference in cm: For calf: right  Ankle 24.5cm, Calf 42 cm, Ankle to calf length 42.5cm.  left Ankle 25 cm, Calf 43 cm, Ankle to  calf length 41 cm.      polyethylene glycol (MIRALAX) 17 GM/Dose powder Take 17 g by mouth 2 times daily as needed.      propranolol (INDERAL) 10 MG tablet Take 10 mg by mouth 3 times daily as needed.      psyllium 28.3 % POWD Take by mouth.      PSYLLIUM HUSK PO Take 1 packet by mouth.      senna-docusate (SENOKOT-S/PERICOLACE) 8.6-50 MG tablet Take 1 tablet by mouth daily as needed.      simethicone (MYLICON) 125 MG chewable tablet Take 125 mg by mouth 3 times daily as needed.      Skin Protectants, Misc. (HYDROCERIN) CREA Apply topically.      vitamin B-12 (CYANOCOBALAMIN) 500 MCG tablet 1 TABLET ORALLY DAILY (DX: B-12 DEFICIENCY)      aspirin 81 MG tablet Take by mouth daily      B Complex Vitamins (VITAMIN B COMPLEX) tablet Take 1 tablet by mouth daily.      Calcium Carbonate-Vitamin D (CALCIUM 600+D3 PO) Take by mouth two times daily      clozapine (CLOZARIL) 100 MG tablet 450 mg Take 450 mg by mouth once daily at bedtime.      CLOZAPINE PO Take 350 mg by mouth once daily in the morning.       divalproex (DEPAKOTE ER) 250 MG 24 hr tablet Take 250 mg by mouth daily Take 250 mgm in AM      divalproex (DEPAKOTE ER) 500 MG 24 hr tablet Take 500 mg by mouth daily Take at bedtime.       levETIRAcetam (KEPPRA) 1000 MG TABS Take one tablet by mouth twice daily      Lysine 1000 MG TABS       Multiple Vitamin (MULTIVITAMINS PO) Take  by mouth. Once daily with food.      MULTIPLE VITAMINS/IRON PO Take 1 tablet by mouth daily.      muscle rub (ARTHRITIS HOT) 10-15 % CREA Apply topically.      psyllium 100 % POWD Take 1 packet by mouth.      risperiDONE (RISPERDAL) 1 MG tablet Take by mouth daily Take at bedtime      trolamine salicylate (ASPERCREME) 10 % external cream Apply topically.           Khadar Gray MD        [place], [place]  MV    4/11/25 12:59 PM  Note  Action 4/11/25 MV   Action Taken Imaging request faxed to [facility] and Carolee Valencia      Action 4/21/25 MV 2.57p   Action Taken Images resolved                  RECORDS  RECEIVED FROM: external   REASON FOR VISIT: PSP   PROVIDER: Dr Gray   DATE OF APPT: 6/12/25   NOTES (FOR ALL VISITS) STATUS DETAILS   OFFICE NOTE from referring provider [facility] Every[facility] [place] NP @ [facility]:  3/31/25   DISCHARGE SUMMARY from hospital Care Everywhere [facility]:  11/9/23-11/16/23   DISCHARGE REPORT from the ER Care Everywhere [facility]:  12/18/24     [facility]:  12/16/24 12/11/24   MEDICATION LIST Care Everywhere     IMAGING  (FOR ALL VISITS)       MRI (HEAD, NECK, SPINE) [place] [facility]  MRI Brain 1/2/25   CT (HEAD, NECK, SPINE) PACS [facility]:  CT Cervical Spine 12/16/24  CT Head 12/16/24  CT Cervical Spine 12/12/24  CT Head 12/12/24  CT Head 2/12/24

## 2025-05-19 ENCOUNTER — LAB REQUISITION (OUTPATIENT)
Dept: LAB | Facility: CLINIC | Age: 75
End: 2025-05-19
Payer: COMMERCIAL

## 2025-05-19 DIAGNOSIS — F25.0 SCHIZOAFFECTIVE DISORDER, BIPOLAR TYPE (H): ICD-10-CM

## 2025-05-19 DIAGNOSIS — Z79.899 OTHER LONG TERM (CURRENT) DRUG THERAPY: ICD-10-CM

## 2025-05-19 LAB
BASOPHILS # BLD AUTO: 0 10E3/UL (ref 0–0.2)
BASOPHILS NFR BLD AUTO: 0 %
EOSINOPHIL # BLD AUTO: 0.1 10E3/UL (ref 0–0.7)
EOSINOPHIL NFR BLD AUTO: 1 %
ERYTHROCYTE [DISTWIDTH] IN BLOOD BY AUTOMATED COUNT: 12.3 % (ref 10–15)
HCT VFR BLD AUTO: 42.6 % (ref 35–47)
HGB BLD-MCNC: 13.9 G/DL (ref 11.7–15.7)
IMM GRANULOCYTES # BLD: 0 10E3/UL
IMM GRANULOCYTES NFR BLD: 0 %
LYMPHOCYTES # BLD AUTO: 2.6 10E3/UL (ref 0.8–5.3)
LYMPHOCYTES NFR BLD AUTO: 41 %
MCH RBC QN AUTO: 30.5 PG (ref 26.5–33)
MCHC RBC AUTO-ENTMCNC: 32.6 G/DL (ref 31.5–36.5)
MCV RBC AUTO: 94 FL (ref 78–100)
MONOCYTES # BLD AUTO: 0.7 10E3/UL (ref 0–1.3)
MONOCYTES NFR BLD AUTO: 11 %
NEUTROPHILS # BLD AUTO: 3 10E3/UL (ref 1.6–8.3)
NEUTROPHILS NFR BLD AUTO: 47 %
NRBC # BLD AUTO: 0 10E3/UL
NRBC BLD AUTO-RTO: 0 /100
PLATELET # BLD AUTO: 184 10E3/UL (ref 150–450)
RBC # BLD AUTO: 4.55 10E6/UL (ref 3.8–5.2)
VALPROATE SERPL-MCNC: 59.9 UG/ML (ref 50–100)
WBC # BLD AUTO: 6.5 10E3/UL (ref 4–11)

## 2025-05-19 PROCEDURE — 80164 ASSAY DIPROPYLACETIC ACD TOT: CPT | Mod: ORL | Performed by: PSYCHIATRY & NEUROLOGY

## 2025-05-19 PROCEDURE — 85025 COMPLETE CBC W/AUTO DIFF WBC: CPT | Mod: ORL | Performed by: PSYCHIATRY & NEUROLOGY

## 2025-05-20 ENCOUNTER — TELEPHONE (OUTPATIENT)
Dept: NEUROLOGY | Facility: CLINIC | Age: 75
End: 2025-05-20
Payer: COMMERCIAL

## 2025-05-20 NOTE — TELEPHONE ENCOUNTER
"Upcoming visit date: 5/23/25  Provider: Dr. Gray  Referred by: Pal[place] L[place]-C [place]  Diagnosis: PSP    Lives in a group home, Dru (son) was recommended to attend the visit with Dr. Gray.    Goals for the visit:     1. Second opinion on her condition.    She states no family is coming with her to the appointment but someone may come with her. Recommended she bring a family member to have a second set of eyes and ears. Reviewed the appointment coming up with Dr. Gray. When asked about getting an updated medication list and verifying the group home she lives in she stated she is not comfortable \"answering all these questions.\" She stated will talk to us on Friday. She then asked if I can call back in a little while and hung up.    [facility] is listed as her group home. Called and left a voicemail on [facility] ([facility]) confidential line asking for a medication list to be faxed to [phone].    What family members are involved in her care?  "

## 2025-05-21 NOTE — TELEPHONE ENCOUNTER
"She states she is no longer at the [facility]. She lives at [facility] in [place]. She does not have a number or further information she can give me. She states they manage her medications but is skeptical they can provide a list. \"I have it in a stack of papers, I will go through them.\" Asked that she ask for a medication list regardless and bring it with her to the appointment. She asks what the appointment is for and visit expectations were reviewed (I.e. history, physical exam, Dr. Gray is a movement disorder specialist).  "

## 2025-05-23 ENCOUNTER — OFFICE VISIT (OUTPATIENT)
Dept: NEUROLOGY | Facility: CLINIC | Age: 75
End: 2025-05-23
Attending: PHYSICIAN ASSISTANT
Payer: COMMERCIAL

## 2025-05-23 VITALS
OXYGEN SATURATION: 94 % | SYSTOLIC BLOOD PRESSURE: 135 MMHG | DIASTOLIC BLOOD PRESSURE: 83 MMHG | RESPIRATION RATE: 16 BRPM | HEART RATE: 89 BPM

## 2025-05-23 DIAGNOSIS — G20.C PRIMARY PARKINSONISM (H): ICD-10-CM

## 2025-05-23 DIAGNOSIS — G23.1 PSP (PROGRESSIVE SUPRANUCLEAR PALSY) (H): Primary | ICD-10-CM

## 2025-05-23 PROCEDURE — 99205 OFFICE O/P NEW HI 60 MIN: CPT | Performed by: PSYCHIATRY & NEUROLOGY

## 2025-05-23 PROCEDURE — 3079F DIAST BP 80-89 MM HG: CPT | Performed by: PSYCHIATRY & NEUROLOGY

## 2025-05-23 PROCEDURE — 1125F AMNT PAIN NOTED PAIN PRSNT: CPT | Performed by: PSYCHIATRY & NEUROLOGY

## 2025-05-23 PROCEDURE — 3075F SYST BP GE 130 - 139MM HG: CPT | Performed by: PSYCHIATRY & NEUROLOGY

## 2025-05-23 ASSESSMENT — UNIFIED PARKINSONS DISEASE RATING SCALE (UPDRS)
PRONATION_SUPINATION_RIGHT: (1) SLIGHT: ANY OF THE FOLLOWING: A) THE REGULAR RHYTHM IS BROKEN WITH ONE WITH ONE OR TWO INTERRUPTIONS OR HESITATIONS OF THE MOVEMENT  B) SLIGHT SLOWING  C) THE AMPLITUDE DECREMENTS NEAR THE END OF THE 10 MOVEMENTS.
RIGIDITY_NECK: (0) NORMAL: NO RIGIDITY.
SPEECH: (1) SLIGHT: LOSS OF MODULATION, DICTION OR VOLUME, BUT STILL ALL WORDS EASY TO UNDERSTAND.
TOTAL_SCORE: 8
FREEZING_GAIT: (0) NORMAL: NO FREEZING.
RIGIDITY_RUE: (1) SLIGHT: RIGIDITY ONLY DETECTED WITH ACTIVATION MANEUVER.
HANDMOVEMENTS_RIGHT: (1) SLIGHT: ANY OF THE FOLLOWING: A) THE REGULAR RHYTHM IS BROKEN WITH ONE WITH ONE OR TWO INTERRUPTIONS OR HESITATIONS OF THE MOVEMENT  B) SLIGHT SLOWING  C) THE AMPLITUDE DECREMENTS NEAR THE END OF THE 10 MOVEMENTS.
AMPLITUDE_LLE: (0) NORMAL: NO TREMOR.
RIGIDITY_LLE: (1) SLIGHT: RIGIDITY ONLY DETECTED WITH ACTIVATION MANEUVER.
FINGER_TAPPING_RIGHT: (2) MILD: ANY OF THE FOLLOWING: A) 3 TO 5 INTERRUPTIONS DURING TAPPING  B) MILD SLOWING  C) THE AMPLITUDE DECREMENTS MIDWAY IN THE 10-MOVEMENT SEQUENCE.
GAIT: (1) SLIGHT: INDEPENDENT WALKING WITH MINOR GAIT IMPAIRMENT.
HANDMOVEMENTS_LEFT: (0) NORMAL: NO PROBLEMS.
RIGIDITY_LUE: (2) MILD: RIGIDITY DETECTED WITHOUT THE ACTIVATION MANEUVER. FULL RANGE OF MOTION IS EASILY ACHIEVED.
ARISING_CHAIR: (2) MILD: PUSHES SELF UP FROM ARMS OF CHAIR WITHOUT DIFFICULTY.
LEG_AGILITY_LEFT: (0) NORMAL: NO PROBLEMS.
TOETAPPING_LEFT: (0) NORMAL: NO PROBLEMS.
CONSTANCY_TREMOR_ATREST: (3) MODERATE: TREMOR AT REST IS PRESENT 51-75% OF THE ENTIRE EXAMINATION PERIOD.
DYSKINESIAS_PRESENT: NO
FACIAL_EXPRESSION: (3) MASKED FACIES WITH LIPS PARTED SOME OF THE TIME WHEN THE MOUTH IS AT REST.
SPONTANEITY_OF_MOVEMENT: (1) SLIGHT: SLIGHT GLOBAL SLOWNESS AND POVERTY OF SPONTANEOUS MOVEMENTS.
PRONATION_SUPINATION_LEFT: (0) NORMAL: NO PROBLEMS.
FINGER_TAPPING_LEFT: (1) SLIGHT: ANY OF THE FOLLOWING: A) THE REGULAR RHYTHM IS BROKEN WITH ONE WITH ONE OR TWO INTERRUPTIONS OR HESITATIONS OF THE MOVEMENT  B) SLIGHT SLOWING  C) THE AMPLITUDE DECREMENTS NEAR THE END OF THE 10 MOVEMENTS.
TOETAPPING_RIGHT: (1) SLIGHT: ANY OF THE FOLLOWING: A) THE REGULAR RHYTHM IS BROKEN WITH ONE WITH ONE OR TWO INTERRUPTIONS OR HESITATIONS OF THE MOVEMENT  B) SLIGHT SLOWING  C) THE AMPLITUDE DECREMENTS NEAR THE END OF THE 10 MOVEMENTS.
AMPLITUDE_RLE: (0) NORMAL: NO TREMOR.
AMPLITUDE_LIP_JAW: (0) NORMAL: NO TREMOR.
RIGIDITY_RLE: (1) SLIGHT: RIGIDITY ONLY DETECTED WITH ACTIVATION MANEUVER.
AMPLITUDE_RUE: (0) NORMAL: NO TREMOR.
TOTAL_SCORE_LEFT: 7
LEG_AGILITY_RIGHT: (1) SLIGHT: ANY OF THE FOLLOWING: A) THE REGULAR RHYTHM IS BROKEN WITH ONE WITH ONE OR TWO INTERRUPTIONS OR HESITATIONS OF THE MOVEMENT  B) SLIGHT SLOWING  C) THE AMPLITUDE DECREMENTS NEAR THE END OF THE 10 MOVEMENTS.
MOVEMENTS_INTERFERE_WITH_RATINGS: NO
POSTURE: (1) SLIGHT: NOT QUITE ERECT, BUT COULD BE NORMAL FOR OLDER PERSONS.
PARKINSONS_MEDS: OFF
AMPLITUDE_LUE: (2) MILD: > 1 CM BUT < 3 CM IN MAXIMAL AMPLITUDE.

## 2025-05-23 ASSESSMENT — PAIN SCALES - GENERAL: PAINLEVEL_OUTOF10: SEVERE PAIN (7)

## 2025-05-23 NOTE — PATIENT INSTRUCTIONS
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     We will order a few different tests to determine what exactly is going on today.     The first test is a special brain scan called a LEONA scan which can help us determine if this is drug induced or if this is due to another condition such as parkinson disease or PSP.     If that test is positive we may consider doing another test called a skin biopsy to determine if this is Parkinson disease or PSP.      In the meantime working with physical therapy would be a good place to start. We can have them come to your home to help with your rehab without making you leave home

## 2025-05-23 NOTE — LETTER
2025       RE: Nabila Jefferson  [address]  [facility] [address]     Dear Colleague,    Thank you for referring your patient, Nabila Jefferson, to the [facility] NEUROLOGY [facility] [place] at [facility]. Please see a copy of my visit note below.      Diagnosis/Summary/Recommendations:    PATIENT: Nabila Jefferson  74 year old female     : 1950    NEO: May 23, 2025     MRN: [id number]    74 year old female  1950  Comm Pref:   retired at [employer]  [address]  [facility] [address]     [phone] (H)  [phone] (M)      Jad.Janna  Other Contact, Sister    Patient Contacts  Contact Name Contact Address Communication Relationship to Patient   Amanda Oconnor Unknown [phone] (Home)  [phone] (Mobile) Friend, Personal Relationship   Darlyn Chio Unknown [phone] (Mobile) Daughter, Personal Relationship   Macy Barrientos Unknown [phone] (Mobile) Sister, Personal Relationship   Janna Street Unknown [phone] (Mobile) Sister, Personal Relationship   Mary Kayla Unknown [phone] (Mobile) Sister, Personal Relationship   Dru Jefferson Unknown [phone] (Mobile) Son, Personal Relationship   Pam Unknown [phone] (Mobile) Personal Relationship     Ronny Gonsales Unknown [phone] (Work) Son, Guardian   Ifeanyi Jefferson Unknown [phone] (Mobile) Sibling, Emergency Contact   Mary Kayla Unknown [phone] (Mobile) Sibling, Emergency Contact   Newpaty Santizo Unknown [phone] (Work) Daughter, Emergency Contact   Janna German Unknown [phone] (Mobile) Sibling, Emergency Contact       Care Teams  Team Member Relationship Specialty Start Date End Date   Liam Taylor MD   NPI: [id number]   [address] [place] [id number]   [phone] (Work)   [phone] (Fax)  PCP - General Pediatric 3/13/25     Pancho Asher MD   NPI: [id number]   [address] [place] [address]   [phone] (Work)   [phone] (Fax)  Psychiatry Psychiatry 11/29/13     Sadaf Nascimento RN   [facility] [address] [phone] (Work)   [phone] (Fax)  Care Manager - [facility] Registered Nurse 10/21/21     Soheila Yeboah RN   [facility] [address] [address] [phone] (Work)   [phone] (Fax)  Care Manager - [facility] Registered Nurse 10/21/21     [facility]   [phone] (Work)      7/6/17     ROSHNI Melo   [address] [facility], [address]   [place], [place] [address] [phone] (Work)  Care Manager - [facility]         Pancho Asher MD   NPI: [id number]   [address]   [place] [address]   [phone] (Work)   [phone] (Fax)  PCP - Psychiatry Outside Provider 6/7/10         Former / Aliases:  Nabila Jefferson [phone] (Mobile)  [email or web address]       Assessment:  (G23.1) PSP (progressive supranuclear palsy) (H)  (primary encounter diagnosis)    Seizure disroder    Brain MRI 1/2/2025  1. Progression of diffuse parenchymal atrophy without specific lobar pattern since 2020.  2. Marked atrophy of the midbrain with decreased auvlamki-pd-znnp ratio, which can be seen in the setting of progressive supranuclear palsy.      Reading Radiologist: Chava Lai    Brain MRI without intravenous contrast    Indication:  Parkinsonian syndrome  .    Comparison:  Head CT 9/21/2020. MRI 6/2/2010.    Technique: Multiplanar multisequence MR images of the brain without contrast a few using Parkinson's/movement disorders protocol.    Findings:  Progression of diffuse parenchymal atrophy since 2020 and 2010, evidenced by large sulci, sylvian fissures, ventricles. Significant atrophy of the midbrain with relatively preserved pontine volume, giving the appearance of the hummingbird sign on midline sagittal image (using short axis technique, the midbrain to donis  ratio is estimated at 0.33).    No abnormal restricted diffusion. Few punctate T2 hyperintense foci in the cerebral hemispheric white matter. No intracranial hemorrhage or mass. Orbits are unremarkable. Small left and trace right mastoid effusions.    Chart review    Paranoid schizophrenia, subchronic condition with acute exacerbation (CMS/[facility]) 12/19/2024     Overview (12/19/2024):    Formatting of this note might be different from the original.  EEG generalized cortical dysfunction likely medication affect; propensity for partial seizures.  Psychiatrist- Dr. Pancho Asher  - Amanda Oconnor  neuropsych testing completed 1/17/11  Admitted w/ exaccerbation 8/11 (add zoloft, d/c resperidal (dizziness resolved off med), additional clozaril)  Readmitted w/ exac 8/25/11  Current medications: depakote, clozaril,       From Chart review   Neurology: Dr. Dixon ([facility])  Last visit 9/8/2017 (no changes); weight loss,  Last seizure 2013.    Admission 4/19/13-4/27/13 then transferred to rehabilitation center; tonic clonic seizure with Jose Alfredo's paralysis; slow to wake up with confusion. Intubated for airway protection.   5/13 neurology visit:  Negative MRI, MRA, cervical MRA, EEG- focal left frontotemporal polymorphic delta slowing.  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    Admission 4/19/13-4/27/13 then transferred to rehabilitation center; tonic clonic seizure with Jose Alfredo's paralysis; slow to wake up with confusion. Intubated for airway protection.   5/13 neurology visit:  Negative MRI, MRA, cervical MRA, EEG- focal left frontotemporal polymorphic delta slowing.  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   This improved over time and did not have residual symptoms. This was supposedly caused by medication changes  This caused twitchiness in her hands and fingers as well     The patient began to have falls ~2 years ago and had bradykinesia over the last year or so  These walking has worsened in the past 6 months or so  At this stage the patient is not walking much at all and is only walking to the bathroom and back  Last fall was about a month ago.  Uses a walker when she walks to the bathroom.  Has freezing of gait with walking per her PT when she is hospitalized.    The tremor was first noted while the patient was hospitalized at [facility]  She never appreciated prior to this as it is difficult to differentiate from her twitchiness of her hands from her tardive.     Denies any dystonia or significant rigidity.     Review of diagnosis    Parkinsonism     Avoidance of dopamine blockers   Is taking Clozaril    Motor complication review   None    Review of Impulse control disorders   None    Review of surgical or medication options   N/A    Gait/Balance/Falls   Last fall is a month ago  Wheelchair dependence.     Exercise/Therapy performed/offered   Has not done PT since leaving the hospital  Reports that this doesn't help her at all to do the PT    Cognitive/Driving   Memory and thinking are going well per the patient.  Donepezil has helped her memory at some point  Her memory was at the worst it had been prior to checking into the hospital in December.   Her memory has been impaired for the past 3-4 years per her son.  Has some periods of losing time    dementia    Mood   Has a diagnosis of bipolar disorder   Since leaving the hospital her mood has been a bit of a challenge   She is feeling anxious and depressed     Has intermittent spontanoeus crying but seems to be triggered by memories.     Depakote level 65.4 on  5/5/2025      Hallucinations/delusions   The patient began to have significant hallucinations in December 2024 and was hospitalized at [facility] due to not consistently getting her mediations    Had hallucinations prior to her hospitalization in December but none since then.     Paranoid schizophrneia  Psycohsis    Sleep   Is sleeping most of the day at this point.   Part of this is driven by the fact that she doesn't want to get up and go around the group home  No history of dream enactment behavior    Bladder/Renal/Prostate/Gyn/Other  Has no urinary incontinence at this stage  Did previously when she had a bed that was too low for her to get out of bed on her own    Chronic renal disease -blood workk normal on 12/26/2024    GI/Constipation/GERD   Swallowing is going okay other than dry mouth from the atropine.   Has drooling problems and takes atropine  Constipation is not a big issue for her  Diarrhea is a chronic problem  Has some incontinence    Chronic diarrhea  Esophageal dysmotility   Liver functions normal 12/26/2024      ENDO/Lipid/DM/Bone density/Thyroid  No DM or thyroid issue    Osteopenia  T4 was normal on 12/26/2024 and tSH was increased at 4.85    Cardio/heart/Hyper or Hypotensive   Hypotension  No significant lightheadedness or dizziness  No other cardiac problems    Vision/Dry Eyes/Cataracts/Glaucoma/Macular   Feels like she can't see because she doesn't have the appropriate glasses    Anatomical narrow angle  Bilateral cataracts     Heme/Anticoagulation/Antiplatelet/Anemia/Other  No history of blood clotting disorders    CBC was normal on 5/5/2025    ENT/Resp  Covid  Has no significant breathing problems   Has an intact sense of smell    Skin/Cancer/Seborrhea/other  No issues with skin cancer    Musculoskeletal/Pain/Headache  Osteopenia left femur  Generalized weakness  Fall risk  Wheelchair dependences  Has been having severe right thigh>left thigh pain.   This has been there for quite awhile but  isn't sure when this started exactly.       Other:        Medications      AM   QHS   Acetaminophen tylenol 325mg  PRN       Aspirin 81mg   1      Atropine 1% sol ORAL PRN       B complex        Buspirone buspar 5mg   1   1   Jacky citrate Vit D  1      Clozapine clozaril 200mg      1   Clozapine clozaril 50mg      1   Diclofenac voltaren gel  PRN       Divalproex depakote ER 500mg 24h  1   1   Docusate colace 100mg   2   2   Donepezil aricept 10mg   1      Ibuprofen advil 200mg  PRN       Levetiracetam keppra 500mg  2   2   Loperamide imodium 2mg PRN       MVI  1      Melatonin 3 mg     1   Polyethylene glycol miralax   1   1   Psyllium   1      Vit B12 cyanocobalamin 500mcg  1/2                        Plan:  Parkinsonism - drug induced from Risperdal vs PD vs PSP.    We will order a few different tests to determine what exactly is going on today.    The first test is a special brain scan called a LEONA scan which can help us determine if this is drug induced or if this is due to another condition such as parkinson disease or PSP.    If that test is positive we may consider doing another test called a skin biopsy to determine if this is Parkinson disease or PSP.     In the meantime working with physical therapy would be a good place to start. We can have them come to your home to help with your rehab without making you leave home                    Documentation of a Face-to-Face Physician Encounter May 23, 2025    Nabila Jefferson  1950  [id number]    I certify that this patient is under my care and that I, or a nurse practitioner or physician's assistant working with me, had a face-to-face encounter that meets the physician face-to-face encounter requirements with this patient on: 5/23/2025.    The encounter with the patient was in whole, or in part, for the following medical condition, which is the primary reason for home health care:  Parkinsonism    I certify that, based on my findings, the following services  are medically necessary home health services: Nursing, Occupational therapy and Physical Therapy    My clinical findings support the need for the above services because: Parkinsonism    Further, I certify that my clinical findings support that this patient is homebound (i.e. absences from home require considerable and taxing effort and are for medical reasons or [facility] services or infrequently or of short duration when for other reasons) because: Parkinsonism      Physician signature ___________________________________   May 23, 2025  Physician name: Ajit Tolentino MD    Fax ([phone]) or scan/email ([email or web address]) this completed document to [facility] within 24 hours of the referral date.  Questions: [phone].      Coding statement:   Medical Decision Making:  #  Chronic progressive medical conditions addressed  - see above --   Review and/or interpretation of unique test or documentation from a provider outside of neurology yes   Independent historian provided additional details  no/yes  Prescription drug management and review of potential side effects and/or monitoring for side effects  -- see above ---  Health impacted by social determinants of health  yes    I have reviewed the note as documented above.  This accurately captures the substance of my conversation with the patient and total time spent preparing for visit, executing visit and completing visit on the day of the visit:  70 minutes.  The portion of this total time included face to face time     The longitudinal plan of care for Nabila Jefferson was addressed during this visit. Due to the added complexity in care, I will continue to support Nabila R Ronny in the subsequent management of this condition(s) and with the ongoing continuity of care of this condition(s).      Khadar Gray MD     ______________________________________  /83   Pulse 89   Resp 16   SpO2 94%   Gen: alert, active, attentive, appropriately groomed    Psych: mood stable     NEURO:  MS: Tangential and struggles to understand questions.     CN:  II: VFF.  III, IV, VI: Reduced EOM. Saccades appear intact without issues  VII: Face symmetric at rest and with activation  VIII: Intact to conversation  IX, X: Palate rise b/l, uvula midline.  No dysarthria.  XII: Tongue protrudes midline. No fasciculation or atrophy noted.    Motor: Head bobbing and tongue and mouth movements   R/L  Shoulder abd      5/5  Elbow flex  5/5  Elbow ext  5/5  Wrist flex  5/5  Wrist ext  5/5    Hip flex  5/5  Knee flex  5/5  Knee ext  5/5    Reflexes: Reduced reflexes throughout. Positive gllobellar, negaitve clap, grasp and pamlmomental    Sensation:  Intact to LT in all extremities.    Coordination:  FTN intact bilaterally.    Gait:  Reduced stride length and right arm swing. Decent turns.         5/23/2025     3:00 PM   UPDRS Motor Scale   Time: 15:13   Medication Off   R Brain DBS: None   L Brain DBS: None   Dyskinesia (LID) No   Did LID interfere No   Speech 1   Facial Expression 2   Rigidity Neck 0   Rigidity RUE 1   Rigidity LUE 2   Rigidity RLE 1   Rigidity LLE 1   Finger Taps R 2   Finger Taps L 1   Hand Mvt R 1   Hand Mvt L 0   Pron-/Supinate R 1   Pron-/Supinate L 0   Toe Tap R 1   Toe Tap L 0   Leg Agility R 1   Leg Agility L 0   Postural Tremor RUE 0   Postural Tremor LUE 0   Kinetic Tremor RUE 0   Kinetic Tremor LUE 1   Rest Tremor RUE 0   Rest Tremor LUE 2   Rest Tremor RLE 0   Rest Tremor LLE 0   Rest Tremor Lip/Jaw 0   Rest Tremor Constancy 3   Total Right 8   Total Left 7     Last visit date and details:     LVM for the DONSylvain Dahl was uneasy about answering my questions - not sure ill get much more information from her but advised to have a family member with her on the visit. Rooming will get an updated authorization to discuss form.  Sabina Dennis RN  KG    5/20/25 10:30 AM  Note  Summary: Pre-visit plann   Upcoming visit date: 5/23/25  Provider: Dr. Gray  Referred  "by: Palma Loving PA-C ([facility]) for a second opinion  Diagnosis: PSP     Lives in a group home, Dru (son) was recommended to attend the visit with Dr. Gray.    Goals for the visit:     1. Second opinion on her condition.     She states no family is coming with her to the appointment but someone may come with her. Recommended she bring a family member to have a second set of eyes and ears. Reviewed the appointment coming up with Dr. Gray. When asked about getting an updated medication list and verifying the group home she lives in she stated she is not comfortable \"answering all these questions.\" She stated will talk to us on Friday. She then asked if I can call back in a little while and hung up.    [facility] is listed as her group home. Called and left a voicemail on [email or web address]'s [employer]) confidential line asking for a medication list to be faxed to [phone].            3/31/2025 note    Nabila Jefferson is a 74 year-old with a past medical history including dementia without behavioral disturbance, seizure disorder, schizophrenia, gait abnormality with recurrent falls, hypotension, chronic kidney disease and others who is seen in neurological follow-up related to suspected progressive supranuclear palsy. The patient was hospitalized at [facility] beginning in January 2025 with changes in her mental status including increased hallucinations. PT was consulted due to gait instability and falls. Brain MRI was completed with findings suggestive of progressive supranuclear palsy. Neurology did evaluate the patient and suspected that she does have PSP. They did not recommend a trial of carbidopa levodopa because of her psychosis and history of orthostasis with recurrent falls.    She comes to clinic with a group home staff worker today. She does vaguely recall being told about PSP but is not clear on the details. Since she was discharged from hospital she has been walking short distances. She has continued to have " "occasional falls. She usually falls backwards. She does endorse stiffness and tremor. She is maintained on Clozaril and would not want to stop it. She recalls having a psychiatric decompensation in the past with an attempt to stop Clozaril.    Because of the potential severity of her diagnosis and the risks with using Sinemet we agreed to get another opinion with a movement disorder specialist at the [facility].     She has found Aricept very helpful with her cognitive function we will plan to continue this.    Our discussion included:  We reviewed that her history, examination and brain MRI are concerning for a condition called progressive supranuclear palsy. We agreed to plan a second opinion through the [facility] movement disorder clinic. I called her son, Dru, and encouraged him to attend this appointment with her. We agreed not to try carbidopa-levodopa now since it could make you schizophrenia symptoms and falls worse.   We do not have a disease modifying medication for parkinsonian disorders, including PSP.   OT and PT can help manage symptoms with PSP. Recurrent falls are common.   I will update her psychiatry provider. I refilled aricept.   We'll plan follow up here in three months but she can cancel this if she establishes care at the [facility].     Return in about 3 months (around 6/30/2025).    History of Present Illness:     Referred by Dimitri Langford MD for gait abnormality evaluation.     Ms. Jefferson was hospitalized in early 2025 and seen by neurology for evaluation of parkinsonism. Neurology met with her and the clinical picture was suggestive of parkinsomism, with a \"parkinson plus\" syndrome suspected. Progressive supranuclear palsy (PSP) seemed likely based on her presentation. Neurology was reluctant to use sinemet due to her history of orthostatic hypotension, frequent falls and psychosis. She was observed to have a resting tremor in bilateral hands, worse with distraction, shuffling " gait going back 10 years, and mild rigidity. During her hospitalization, she had severe postural instability, unable to stand without assistance, sit up without assistance, or ambulate without a walker/assistance. Brain MRI was completed and concerning for PSP structural changes.     She presents with group home staff today. They note she has been walking short distances, using a wheelchair for longer distances. She has had ongoing falls since hospital discharge. She usually falls backwards. She has noted difficulty looking upward.     She agrees she has memory impairment but feels aricept has been very helpful.     She makes her own medical decisions, has two children locally and would consider involving them more. She gives me permission to call her son.     Brain MRI 1/2025:   1. Progression of diffuse parenchymal atrophy without specific lobar pattern since 2020.   2. Marked atrophy of the midbrain with decreased ocfnpors-er-hgzy ratio, which can be seen in the setting of progressive supranuclear palsy.     Mental Status Exam: She is awake, alert, and cooperative with interview. She can follow some simple commands easily  Cranial Nerves: saccadic intrusions of smooth movements, poor initiation of saccades, upgaze intact on my examination, facial movements symmetric, hearing intact to conversation, mild dysphonia  Motor: Symmetric strength against gravity and resistance bilaterally. Moderately increased tone bilaterally. Bradykinesia noted bilaterally. Bilateral arm resting tremor, more prominent right than left  Sensory: sensation intact to light touch on arms and legs bilaterally  Coordination: finger-nose-finger intact bilaterally  Gait: Rises from a chair with assistance of arms rests, can not take steps. Pull test negative today       ______________________________________      Patient was asked about 14 Review of systems including changes in vision (dry eyes, double vision), hearing, heart, lungs,  musculoskeletal, depression, anxiety, snoring, RBD, insomnia, urinary frequency, urinary urgency, constipation, swallowing problems, hematological, ID, allergies, skin problems: seborrhea, endocrinological: thyroid, diabetes, cholesterol; balance, weight changes, and other neurological problems and these were not significant at this time except for   Patient Active Problem List   Diagnosis     Anatomical narrow angle     Bipolar disorder (H)     Cataracts, bilateral     Chronic diarrhea     Chronic kidney disease, stage 2 (mild)     COVID-19 virus infection     Dementia without behavioral disturbance (H)     Mild dementia (H)     Esophageal dysmotility     Gait abnormality     Generalized tonic-clonic seizure (H)     Generalized weakness     Hypotension     LBBB (left bundle branch block)     Paranoid schizophrenia, subchronic condition with acute exacerbation (H)     Osteopenia of neck of left femur     Personal history of fall     Schizophrenia (H)     Schizophrenia, chronic condition with acute exacerbation (H)     Seizure disorder (H)     Wheelchair dependence     Psychosis, unspecified psychosis type (H)     PSP (progressive supranuclear palsy) (H)        No Known Allergies  No past surgical history on file.  Past Medical History:   Diagnosis Date     PSP (progressive supranuclear palsy) (H) 05/12/2025     Social History     Socioeconomic History     Marital status: Single     Spouse name: Not on file     Number of children: Not on file     Years of education: Not on file     Highest education level: Not on file   Occupational History     Not on file   Tobacco Use     Smoking status: Not on file     Smokeless tobacco: Not on file   Substance and Sexual Activity     Alcohol use: Not on file     Drug use: Not on file     Sexual activity: Not on file   Other Topics Concern     Not on file   Social History Narrative     Not on file     Social Drivers of Health     Financial Resource Strain: Patient Unable To Answer  (12/19/2024)    Received from [facility]    Overall Financial Resource Strain (CARDIA)      Difficulty of Paying Living Expenses: Patient unable to answer   Food Insecurity: Patient Unable To Answer (12/19/2024)    Received from [facility]    Hunger Vital Sign      Worried About Running Out of Food in the Last Year: Patient unable to answer      Ran Out of Food in the Last Year: Patient unable to answer   Transportation Needs: Patient Unable To Answer (12/19/2024)    Received from [facility]    PRAPARE - Transportation      Lack of Transportation (Medical): Patient unable to answer      Lack of Transportation (Non-Medical): Patient unable to answer   Physical Activity: Unknown (12/17/2019)    Received from [facility]    Exercise Vital Sign      Days of Exercise per Week: 0 days      Minutes of Exercise per Session: Not on file   Stress: Not on file   Social Connections: Socially Integrated (4/5/2024)    Received from [facility]    Social Connections      Do you often feel lonely or isolated from those around you?: 0   Interpersonal Safety: Patient Unable To Answer (12/19/2024)    Received from [facility]    Humiliation, Afraid, Rape, and Kick questionnaire      Fear of Current or Ex-Partner: Patient unable to answer      Emotionally Abused: Patient unable to answer      Physically Abused: Patient unable to answer      Sexually Abused: Patient unable to answer   Housing Stability: Unknown (12/19/2024)    Received from [facility]    Housing Stability      What is your housing situation today?: 5 - Patient unable to answer       Drug and lactation database from the [place] [employer]:  http://toxnet.nlm.nih.gov/cgi-bin/sis/htmlgen?LACT      B/P: Data Unavailable, T: Data Unavailable, P: Data Unavailable, R: Data Unavailable 0 lbs 0 oz  There were no vitals taken for this visit., There  is no height or weight on file to calculate BMI.  Medications and Vitals not listed above were documented in the cart and reviewed by me.     Current Outpatient Medications   Medication Sig Dispense Refill     acetaminophen (TYLENOL) 325 MG tablet Take 2 tablets by mouth every 4 hours as needed.       atropine 1 % ophthalmic solution Place 1 drop under the tongue 4 times daily as needed.       busPIRone (BUSPAR) 5 MG tablet Take 5 mg by mouth 2 times daily.       calcium citrate-vitamin D (CITRACAL) 315-5 MG-MCG TABS per tablet Take 1 tablet by mouth daily.       cyanocobalamin (VITAMIN B-12) 1000 MCG tablet Take 500 mcg by mouth daily.       diclofenac (VOLTAREN) 1 % topical gel Apply 2-4 g (measured using the dosing card supplied in the drug product carton) to skin of affected area up to 4 times a day as needed for musculoskeletal pain. Wash hands after application of product.       docusate sodium (DSS) 100 MG capsule Take 100 mg by mouth 2 times daily.       donepezil (ARICEPT) 10 MG tablet Take 10 mg by mouth daily.       Ferrous Sulfate (IRON PO) Take 1 tablet by mouth daily.       gabapentin (NEURONTIN) 100 MG capsule Take 1 capsule by mouth 3 times daily.       guaiFENesin (MUCINEX) 600 MG 12 hr tablet Take 600 mg by mouth 2 times daily as needed.       ibuprofen (ADVIL/MOTRIN) 200 MG tablet Take 200 mg by mouth every 6 hours as needed.       loperamide (IMODIUM A-D) 2 MG tablet Take 2 tablets (4mg) orally with 1st loose stool, then 1 tablet (2mg) with other loose stools. Max 8 tablets (16 mg) in 24 hrs.       loperamide (IMODIUM) 2 MG capsule Take 2 mg by mouth.       meloxicam (MOBIC) 7.5 MG tablet Take 7.5 mg by mouth 2 times daily.       methyl salicylate-menthol (ICY HOT) ointment Apply topically.       multivitamin RENAL (TRIPHROCAPS) 1 capsule capsule Take 1 capsule by mouth daily.       NONFORMULARY Hospital socks for /balance.       PERMETHRIN EX Wheelchair: Standard  with leg rests: (Elevating  Length of need: TBD       PERMETHRIN EX Please dispense one full size bed.       PERMETHRIN EX Hospital bed with mattress and 1/2 rails. Semi-electric bed. Length of need 3 months. Bed extender:no       PERMETHRIN EX For personal use. Length: calf Strength: 16-20 mmHg Circumference in cm: For calf: right  Ankle 24.5cm, Calf 42 cm, Ankle to calf length 42.5cm.  left Ankle 25 cm, Calf 43 cm, Ankle to calf length 41 cm.       polyethylene glycol (MIRALAX) 17 GM/Dose powder Take 17 g by mouth 2 times daily as needed.       propranolol (INDERAL) 10 MG tablet Take 10 mg by mouth 3 times daily as needed.       psyllium 28.3 % POWD Take by mouth.       PSYLLIUM HUSK PO Take 1 packet by mouth.       senna-docusate (SENOKOT-S/PERICOLACE) 8.6-50 MG tablet Take 1 tablet by mouth daily as needed.       simethicone (MYLICON) 125 MG chewable tablet Take 125 mg by mouth 3 times daily as needed.       Skin Protectants, Misc. (HYDROCERIN) CREA Apply topically.       vitamin B-12 (CYANOCOBALAMIN) 500 MCG tablet 1 TABLET ORALLY DAILY (DX: B-12 DEFICIENCY)       aspirin 81 MG tablet Take by mouth daily       B Complex Vitamins (VITAMIN B COMPLEX) tablet Take 1 tablet by mouth daily.       Calcium Carbonate-Vitamin D (CALCIUM 600+D3 PO) Take by mouth two times daily       clozapine (CLOZARIL) 100 MG tablet 450 mg Take 450 mg by mouth once daily at bedtime.       CLOZAPINE PO Take 350 mg by mouth once daily in the morning.        divalproex (DEPAKOTE ER) 250 MG 24 hr tablet Take 250 mg by mouth daily Take 250 mgm in AM       divalproex (DEPAKOTE ER) 500 MG 24 hr tablet Take 500 mg by mouth daily Take at bedtime.        levETIRAcetam (KEPPRA) 1000 MG TABS Take one tablet by mouth twice daily       Lysine 1000 MG TABS        Multiple Vitamin (MULTIVITAMINS PO) Take  by mouth. Once daily with food.       MULTIPLE VITAMINS/IRON PO Take 1 tablet by mouth daily.       muscle rub (ARTHRITIS HOT) 10-15 % CREA Apply topically.       psyllium 100 %  POWD Take 1 packet by mouth.       risperiDONE (RISPERDAL) 1 MG tablet Take by mouth daily Take at bedtime       trolamine salicylate (ASPERCREME) 10 % external cream Apply topically.           Baylee Martinez MD  MV    4/11/25 12:59 PM  Note  Action 4/11/25 MV   Action Taken Imaging request faxed to [facility] and Carole[facility]      Action 4/21/25 MV 2.57p   Action Taken Images resolved                  RECORDS RECEIVED FROM: external   REASON FOR VISIT: PSP   PROVIDER: Dr Gray   DATE OF APPT: 6/12/25   NOTES (FOR ALL VISITS) STATUS DETAILS   OFFICE NOTE from referring provider [facility] Every[facility] [place] NP @ [facility]:  3/31/25   DISCHARGE SUMMARY from hospital Care Everywhere [facility]:  11/9/23-11/16/23   DISCHARGE REPORT from the ER Care Everywhere [facility]:  12/18/24     [facility]:  12/16/24 12/11/24   MEDICATION LIST Care Everywhere     IMAGING  (FOR ALL VISITS)       MRI (HEAD, NECK, SPINE) PACS [facility]:  MRI Brain 1/2/25   CT (HEAD, NECK, SPINE) PACS [facility]:  CT Cervical Spine 12/16/24  CT Head 12/16/24  CT Cervical Spine 12/12/24  CT Head 12/12/24  CT Head 2/12/24                 Again, thank you for allowing me to participate in the care of your patient.      Sincerely,    Khadar Gray MD

## 2025-05-23 NOTE — Clinical Note
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 I'm not sure if we have a running list of people for this but I don't seem to have it. If you can help me with this I'd appreciate it!  Best, Yoav

## 2025-05-27 ENCOUNTER — LAB REQUISITION (OUTPATIENT)
Dept: LAB | Facility: CLINIC | Age: 75
End: 2025-05-27
Payer: COMMERCIAL

## 2025-05-27 DIAGNOSIS — Z79.899 OTHER LONG TERM (CURRENT) DRUG THERAPY: ICD-10-CM

## 2025-05-27 DIAGNOSIS — F25.0 SCHIZOAFFECTIVE DISORDER, BIPOLAR TYPE (H): ICD-10-CM

## 2025-05-27 LAB
BASOPHILS # BLD AUTO: 0 10E3/UL (ref 0–0.2)
BASOPHILS NFR BLD AUTO: 0 %
EOSINOPHIL # BLD AUTO: 0.1 10E3/UL (ref 0–0.7)
EOSINOPHIL NFR BLD AUTO: 2 %
ERYTHROCYTE [DISTWIDTH] IN BLOOD BY AUTOMATED COUNT: 12.6 % (ref 10–15)
HCT VFR BLD AUTO: 39.3 % (ref 35–47)
HGB BLD-MCNC: 13.3 G/DL (ref 11.7–15.7)
IMM GRANULOCYTES # BLD: 0 10E3/UL
IMM GRANULOCYTES NFR BLD: 0 %
LYMPHOCYTES # BLD AUTO: 2.7 10E3/UL (ref 0.8–5.3)
LYMPHOCYTES NFR BLD AUTO: 48 %
MCH RBC QN AUTO: 31.2 PG (ref 26.5–33)
MCHC RBC AUTO-ENTMCNC: 33.8 G/DL (ref 31.5–36.5)
MCV RBC AUTO: 92 FL (ref 78–100)
MONOCYTES # BLD AUTO: 0.6 10E3/UL (ref 0–1.3)
MONOCYTES NFR BLD AUTO: 11 %
NEUTROPHILS # BLD AUTO: 2.2 10E3/UL (ref 1.6–8.3)
NEUTROPHILS NFR BLD AUTO: 39 %
NRBC # BLD AUTO: 0 10E3/UL
NRBC BLD AUTO-RTO: 0 /100
PLATELET # BLD AUTO: 171 10E3/UL (ref 150–450)
RBC # BLD AUTO: 4.26 10E6/UL (ref 3.8–5.2)
WBC # BLD AUTO: 5.7 10E3/UL (ref 4–11)

## 2025-06-02 ENCOUNTER — LAB REQUISITION (OUTPATIENT)
Dept: LAB | Facility: CLINIC | Age: 75
End: 2025-06-02
Payer: COMMERCIAL

## 2025-06-02 DIAGNOSIS — F25.0 SCHIZOAFFECTIVE DISORDER, BIPOLAR TYPE (H): ICD-10-CM

## 2025-06-02 DIAGNOSIS — Z79.899 OTHER LONG TERM (CURRENT) DRUG THERAPY: ICD-10-CM

## 2025-06-02 LAB
BASOPHILS # BLD AUTO: 0 10E3/UL (ref 0–0.2)
BASOPHILS NFR BLD AUTO: 0 %
EOSINOPHIL # BLD AUTO: 0.1 10E3/UL (ref 0–0.7)
EOSINOPHIL NFR BLD AUTO: 2 %
ERYTHROCYTE [DISTWIDTH] IN BLOOD BY AUTOMATED COUNT: 12.8 % (ref 10–15)
HCT VFR BLD AUTO: 41.9 % (ref 35–47)
HGB BLD-MCNC: 13.9 G/DL (ref 11.7–15.7)
IMM GRANULOCYTES # BLD: 0 10E3/UL
IMM GRANULOCYTES NFR BLD: 0 %
LYMPHOCYTES # BLD AUTO: 3.1 10E3/UL (ref 0.8–5.3)
LYMPHOCYTES NFR BLD AUTO: 42 %
MCH RBC QN AUTO: 31.1 PG (ref 26.5–33)
MCHC RBC AUTO-ENTMCNC: 33.2 G/DL (ref 31.5–36.5)
MCV RBC AUTO: 94 FL (ref 78–100)
MONOCYTES # BLD AUTO: 0.8 10E3/UL (ref 0–1.3)
MONOCYTES NFR BLD AUTO: 11 %
NEUTROPHILS # BLD AUTO: 3.3 10E3/UL (ref 1.6–8.3)
NEUTROPHILS NFR BLD AUTO: 45 %
NRBC # BLD AUTO: 0 10E3/UL
NRBC BLD AUTO-RTO: 0 /100
PLATELET # BLD AUTO: 163 10E3/UL (ref 150–450)
RBC # BLD AUTO: 4.47 10E6/UL (ref 3.8–5.2)
WBC # BLD AUTO: 7.4 10E3/UL (ref 4–11)

## 2025-06-02 PROCEDURE — 85025 COMPLETE CBC W/AUTO DIFF WBC: CPT | Mod: ORL | Performed by: PSYCHIATRY & NEUROLOGY

## 2025-06-09 ENCOUNTER — LAB REQUISITION (OUTPATIENT)
Dept: LAB | Facility: CLINIC | Age: 75
End: 2025-06-09
Payer: COMMERCIAL

## 2025-06-09 DIAGNOSIS — Z79.899 OTHER LONG TERM (CURRENT) DRUG THERAPY: ICD-10-CM

## 2025-06-09 DIAGNOSIS — F25.0 SCHIZOAFFECTIVE DISORDER, BIPOLAR TYPE (H): ICD-10-CM

## 2025-06-09 LAB
BASOPHILS # BLD AUTO: 0 10E3/UL (ref 0–0.2)
BASOPHILS NFR BLD AUTO: 0 %
EOSINOPHIL # BLD AUTO: 0.2 10E3/UL (ref 0–0.7)
EOSINOPHIL NFR BLD AUTO: 2 %
ERYTHROCYTE [DISTWIDTH] IN BLOOD BY AUTOMATED COUNT: 12.4 % (ref 10–15)
HCT VFR BLD AUTO: 40.7 % (ref 35–47)
HGB BLD-MCNC: 14 G/DL (ref 11.7–15.7)
IMM GRANULOCYTES # BLD: 0 10E3/UL
IMM GRANULOCYTES NFR BLD: 0 %
LYMPHOCYTES # BLD AUTO: 2.7 10E3/UL (ref 0.8–5.3)
LYMPHOCYTES NFR BLD AUTO: 39 %
MCH RBC QN AUTO: 31.4 PG (ref 26.5–33)
MCHC RBC AUTO-ENTMCNC: 34.4 G/DL (ref 31.5–36.5)
MCV RBC AUTO: 91 FL (ref 78–100)
MONOCYTES # BLD AUTO: 0.8 10E3/UL (ref 0–1.3)
MONOCYTES NFR BLD AUTO: 11 %
NEUTROPHILS # BLD AUTO: 3.3 10E3/UL (ref 1.6–8.3)
NEUTROPHILS NFR BLD AUTO: 47 %
NRBC # BLD AUTO: 0 10E3/UL
NRBC BLD AUTO-RTO: 0 /100
PLATELET # BLD AUTO: 176 10E3/UL (ref 150–450)
RBC # BLD AUTO: 4.46 10E6/UL (ref 3.8–5.2)
WBC # BLD AUTO: 7 10E3/UL (ref 4–11)

## 2025-06-09 PROCEDURE — 85025 COMPLETE CBC W/AUTO DIFF WBC: CPT | Mod: ORL | Performed by: PSYCHIATRY & NEUROLOGY

## 2025-06-16 ENCOUNTER — LAB REQUISITION (OUTPATIENT)
Dept: LAB | Facility: CLINIC | Age: 75
End: 2025-06-16
Payer: COMMERCIAL

## 2025-06-16 DIAGNOSIS — Z79.899 OTHER LONG TERM (CURRENT) DRUG THERAPY: ICD-10-CM

## 2025-06-16 DIAGNOSIS — F25.0 SCHIZOAFFECTIVE DISORDER, BIPOLAR TYPE (H): ICD-10-CM

## 2025-06-16 LAB
BASOPHILS # BLD AUTO: 0 10E3/UL (ref 0–0.2)
BASOPHILS NFR BLD AUTO: 1 %
EOSINOPHIL # BLD AUTO: 0.1 10E3/UL (ref 0–0.7)
EOSINOPHIL NFR BLD AUTO: 2 %
ERYTHROCYTE [DISTWIDTH] IN BLOOD BY AUTOMATED COUNT: 12.2 % (ref 10–15)
HCT VFR BLD AUTO: 42.1 % (ref 35–47)
HGB BLD-MCNC: 14.2 G/DL (ref 11.7–15.7)
IMM GRANULOCYTES # BLD: 0 10E3/UL
IMM GRANULOCYTES NFR BLD: 0 %
LYMPHOCYTES # BLD AUTO: 2.2 10E3/UL (ref 0.8–5.3)
LYMPHOCYTES NFR BLD AUTO: 39 %
MCH RBC QN AUTO: 30.9 PG (ref 26.5–33)
MCHC RBC AUTO-ENTMCNC: 33.7 G/DL (ref 31.5–36.5)
MCV RBC AUTO: 92 FL (ref 78–100)
MONOCYTES # BLD AUTO: 0.8 10E3/UL (ref 0–1.3)
MONOCYTES NFR BLD AUTO: 14 %
NEUTROPHILS # BLD AUTO: 2.5 10E3/UL (ref 1.6–8.3)
NEUTROPHILS NFR BLD AUTO: 45 %
NRBC # BLD AUTO: 0 10E3/UL
NRBC BLD AUTO-RTO: 0 /100
PLATELET # BLD AUTO: 202 10E3/UL (ref 150–450)
RBC # BLD AUTO: 4.6 10E6/UL (ref 3.8–5.2)
WBC # BLD AUTO: 5.5 10E3/UL (ref 4–11)

## 2025-06-16 PROCEDURE — 85025 COMPLETE CBC W/AUTO DIFF WBC: CPT | Mod: ORL | Performed by: PSYCHIATRY & NEUROLOGY

## 2025-08-26 ENCOUNTER — OFFICE VISIT (OUTPATIENT)
Dept: NEUROLOGY | Facility: CLINIC | Age: 75
End: 2025-08-26
Payer: COMMERCIAL

## 2025-08-26 VITALS
HEART RATE: 87 BPM | SYSTOLIC BLOOD PRESSURE: 109 MMHG | OXYGEN SATURATION: 96 % | HEIGHT: 65 IN | DIASTOLIC BLOOD PRESSURE: 64 MMHG | BODY MASS INDEX: 34.66 KG/M2 | WEIGHT: 208 LBS

## 2025-08-26 DIAGNOSIS — G20.C PARKINSONISM, UNSPECIFIED PARKINSONISM TYPE (H): Primary | ICD-10-CM

## 2025-08-26 PROCEDURE — 99213 OFFICE O/P EST LOW 20 MIN: CPT | Performed by: NURSE PRACTITIONER

## 2025-08-26 PROCEDURE — 3074F SYST BP LT 130 MM HG: CPT | Performed by: NURSE PRACTITIONER

## 2025-08-26 PROCEDURE — 1126F AMNT PAIN NOTED NONE PRSNT: CPT | Performed by: NURSE PRACTITIONER

## 2025-08-26 PROCEDURE — 3078F DIAST BP <80 MM HG: CPT | Performed by: NURSE PRACTITIONER

## 2025-08-26 ASSESSMENT — PAIN SCALES - GENERAL: PAINLEVEL_OUTOF10: NO PAIN (0)
